# Patient Record
Sex: FEMALE | Race: WHITE | NOT HISPANIC OR LATINO | Employment: OTHER | URBAN - METROPOLITAN AREA
[De-identification: names, ages, dates, MRNs, and addresses within clinical notes are randomized per-mention and may not be internally consistent; named-entity substitution may affect disease eponyms.]

---

## 2017-08-14 ENCOUNTER — COMMUNICATION - HEALTHEAST (OUTPATIENT)
Dept: INTERNAL MEDICINE | Facility: CLINIC | Age: 62
End: 2017-08-14

## 2017-08-15 ENCOUNTER — OFFICE VISIT - HEALTHEAST (OUTPATIENT)
Dept: INTERNAL MEDICINE | Facility: CLINIC | Age: 62
End: 2017-08-15

## 2017-08-15 ENCOUNTER — COMMUNICATION - HEALTHEAST (OUTPATIENT)
Dept: INTERNAL MEDICINE | Facility: CLINIC | Age: 62
End: 2017-08-15

## 2017-08-15 DIAGNOSIS — Z12.11 SCREEN FOR COLON CANCER: ICD-10-CM

## 2017-08-15 ASSESSMENT — MIFFLIN-ST. JEOR: SCORE: 1543.33

## 2017-08-22 ENCOUNTER — RECORDS - HEALTHEAST (OUTPATIENT)
Dept: ADMINISTRATIVE | Facility: OTHER | Age: 62
End: 2017-08-22

## 2017-08-31 ENCOUNTER — COMMUNICATION - HEALTHEAST (OUTPATIENT)
Dept: INTERNAL MEDICINE | Facility: CLINIC | Age: 62
End: 2017-08-31

## 2017-09-05 ENCOUNTER — COMMUNICATION - HEALTHEAST (OUTPATIENT)
Dept: INTERNAL MEDICINE | Facility: CLINIC | Age: 62
End: 2017-09-05

## 2017-09-05 ENCOUNTER — OFFICE VISIT - HEALTHEAST (OUTPATIENT)
Dept: INTERNAL MEDICINE | Facility: CLINIC | Age: 62
End: 2017-09-05

## 2017-09-05 DIAGNOSIS — Z01.818 PREOP EXAMINATION: ICD-10-CM

## 2017-09-05 LAB
ATRIAL RATE - MUSE: 65 BPM
DIASTOLIC BLOOD PRESSURE - MUSE: NORMAL MMHG
INTERPRETATION ECG - MUSE: NORMAL
P AXIS - MUSE: 48 DEGREES
PR INTERVAL - MUSE: 150 MS
QRS DURATION - MUSE: 78 MS
QT - MUSE: 392 MS
QTC - MUSE: 407 MS
R AXIS - MUSE: 16 DEGREES
SYSTOLIC BLOOD PRESSURE - MUSE: NORMAL MMHG
T AXIS - MUSE: 21 DEGREES
VENTRICULAR RATE- MUSE: 65 BPM

## 2017-09-05 ASSESSMENT — MIFFLIN-ST. JEOR: SCORE: 1552.4

## 2017-09-22 ENCOUNTER — ANESTHESIA - HEALTHEAST (OUTPATIENT)
Dept: SURGERY | Facility: CLINIC | Age: 62
End: 2017-09-22

## 2017-09-22 ENCOUNTER — RECORDS - HEALTHEAST (OUTPATIENT)
Dept: ADMINISTRATIVE | Facility: OTHER | Age: 62
End: 2017-09-22

## 2017-09-22 ENCOUNTER — SURGERY - HEALTHEAST (OUTPATIENT)
Dept: SURGERY | Facility: CLINIC | Age: 62
End: 2017-09-22

## 2017-09-22 ASSESSMENT — MIFFLIN-ST. JEOR: SCORE: 1555.57

## 2017-10-06 ENCOUNTER — COMMUNICATION - HEALTHEAST (OUTPATIENT)
Dept: INTERNAL MEDICINE | Facility: CLINIC | Age: 62
End: 2017-10-06

## 2018-01-30 ENCOUNTER — COMMUNICATION - HEALTHEAST (OUTPATIENT)
Dept: INTERNAL MEDICINE | Facility: CLINIC | Age: 63
End: 2018-01-30

## 2018-02-02 ENCOUNTER — AMBULATORY - HEALTHEAST (OUTPATIENT)
Dept: INTERNAL MEDICINE | Facility: CLINIC | Age: 63
End: 2018-02-02

## 2018-02-02 ENCOUNTER — AMBULATORY - HEALTHEAST (OUTPATIENT)
Dept: LAB | Facility: CLINIC | Age: 63
End: 2018-02-02

## 2018-02-02 ENCOUNTER — COMMUNICATION - HEALTHEAST (OUTPATIENT)
Dept: SCHEDULING | Facility: CLINIC | Age: 63
End: 2018-02-02

## 2018-02-02 DIAGNOSIS — N39.0 UTI (URINARY TRACT INFECTION): ICD-10-CM

## 2018-02-02 LAB
ALBUMIN UR-MCNC: NEGATIVE MG/DL
APPEARANCE UR: CLEAR
BILIRUB UR QL STRIP: NEGATIVE
COLOR UR AUTO: YELLOW
GLUCOSE UR STRIP-MCNC: NEGATIVE MG/DL
HGB UR QL STRIP: NEGATIVE
KETONES UR STRIP-MCNC: NEGATIVE MG/DL
LEUKOCYTE ESTERASE UR QL STRIP: NEGATIVE
NITRATE UR QL: NEGATIVE
PH UR STRIP: 6 [PH] (ref 5–8)
SP GR UR STRIP: <=1.005 (ref 1–1.03)
UROBILINOGEN UR STRIP-ACNC: NORMAL

## 2018-02-05 ENCOUNTER — COMMUNICATION - HEALTHEAST (OUTPATIENT)
Dept: INTERNAL MEDICINE | Facility: CLINIC | Age: 63
End: 2018-02-05

## 2019-03-04 ENCOUNTER — COMMUNICATION - HEALTHEAST (OUTPATIENT)
Dept: INTERNAL MEDICINE | Facility: CLINIC | Age: 64
End: 2019-03-04

## 2019-03-04 ENCOUNTER — AMBULATORY - HEALTHEAST (OUTPATIENT)
Dept: INTERNAL MEDICINE | Facility: CLINIC | Age: 64
End: 2019-03-04

## 2019-03-04 DIAGNOSIS — T78.3XXA ANGIOEDEMA, INITIAL ENCOUNTER: ICD-10-CM

## 2019-03-11 ENCOUNTER — OFFICE VISIT - HEALTHEAST (OUTPATIENT)
Dept: ALLERGY | Facility: CLINIC | Age: 64
End: 2019-03-11

## 2019-03-11 DIAGNOSIS — Z91.018 FOOD ALLERGY: ICD-10-CM

## 2019-03-11 DIAGNOSIS — Z01.82 ENCOUNTER FOR ALLERGY TESTING: ICD-10-CM

## 2019-03-11 ASSESSMENT — MIFFLIN-ST. JEOR: SCORE: 1653.1

## 2019-05-23 ENCOUNTER — OFFICE VISIT - HEALTHEAST (OUTPATIENT)
Dept: FAMILY MEDICINE | Facility: CLINIC | Age: 64
End: 2019-05-23

## 2019-05-23 ENCOUNTER — COMMUNICATION - HEALTHEAST (OUTPATIENT)
Dept: SCHEDULING | Facility: CLINIC | Age: 64
End: 2019-05-23

## 2019-05-23 DIAGNOSIS — B96.89 ACUTE BACTERIAL SINUSITIS: ICD-10-CM

## 2019-05-23 DIAGNOSIS — J01.90 ACUTE BACTERIAL SINUSITIS: ICD-10-CM

## 2019-05-28 ENCOUNTER — COMMUNICATION - HEALTHEAST (OUTPATIENT)
Dept: SCHEDULING | Facility: CLINIC | Age: 64
End: 2019-05-28

## 2019-05-28 ENCOUNTER — COMMUNICATION - HEALTHEAST (OUTPATIENT)
Dept: INTERNAL MEDICINE | Facility: CLINIC | Age: 64
End: 2019-05-28

## 2019-05-28 DIAGNOSIS — J01.90 ACUTE BACTERIAL SINUSITIS: ICD-10-CM

## 2019-05-28 DIAGNOSIS — B96.89 ACUTE BACTERIAL SINUSITIS: ICD-10-CM

## 2019-07-20 ENCOUNTER — OFFICE VISIT - HEALTHEAST (OUTPATIENT)
Dept: FAMILY MEDICINE | Facility: CLINIC | Age: 64
End: 2019-07-20

## 2019-07-20 DIAGNOSIS — H66.002 NON-RECURRENT ACUTE SUPPURATIVE OTITIS MEDIA OF LEFT EAR WITHOUT SPONTANEOUS RUPTURE OF TYMPANIC MEMBRANE: ICD-10-CM

## 2019-07-20 DIAGNOSIS — J03.90 TONSILLITIS: ICD-10-CM

## 2019-07-20 LAB — DEPRECATED S PYO AG THROAT QL EIA: NORMAL

## 2019-07-21 LAB — GROUP A STREP BY PCR: NORMAL

## 2020-01-31 ENCOUNTER — COMMUNICATION - HEALTHEAST (OUTPATIENT)
Dept: SCHEDULING | Facility: CLINIC | Age: 65
End: 2020-01-31

## 2020-01-31 ENCOUNTER — OFFICE VISIT - HEALTHEAST (OUTPATIENT)
Dept: FAMILY MEDICINE | Facility: CLINIC | Age: 65
End: 2020-01-31

## 2020-01-31 DIAGNOSIS — J02.0 STREPTOCOCCAL SORE THROAT: ICD-10-CM

## 2020-01-31 DIAGNOSIS — J02.9 SORE THROAT: ICD-10-CM

## 2020-01-31 LAB — DEPRECATED S PYO AG THROAT QL EIA: ABNORMAL

## 2020-01-31 ASSESSMENT — MIFFLIN-ST. JEOR: SCORE: 1639.15

## 2021-04-18 ENCOUNTER — OFFICE VISIT (OUTPATIENT)
Dept: URGENT CARE | Facility: URGENT CARE | Age: 66
End: 2021-04-18
Payer: MEDICARE

## 2021-04-18 VITALS
WEIGHT: 220 LBS | TEMPERATURE: 97.7 F | OXYGEN SATURATION: 98 % | HEIGHT: 68 IN | HEART RATE: 85 BPM | SYSTOLIC BLOOD PRESSURE: 150 MMHG | DIASTOLIC BLOOD PRESSURE: 80 MMHG | BODY MASS INDEX: 33.34 KG/M2

## 2021-04-18 DIAGNOSIS — R07.0 THROAT PAIN: Primary | ICD-10-CM

## 2021-04-18 LAB
DEPRECATED S PYO AG THROAT QL EIA: NEGATIVE
SPECIMEN SOURCE: NORMAL
SPECIMEN SOURCE: NORMAL
STREP GROUP A PCR: NOT DETECTED

## 2021-04-18 PROCEDURE — 99N1174 PR STATISTIC STREP A RAPID: Performed by: FAMILY MEDICINE

## 2021-04-18 PROCEDURE — 99213 OFFICE O/P EST LOW 20 MIN: CPT | Performed by: FAMILY MEDICINE

## 2021-04-18 PROCEDURE — 87651 STREP A DNA AMP PROBE: CPT | Performed by: FAMILY MEDICINE

## 2021-04-18 ASSESSMENT — MIFFLIN-ST. JEOR: SCORE: 1586.41

## 2021-04-30 ENCOUNTER — OFFICE VISIT - HEALTHEAST (OUTPATIENT)
Dept: INTERNAL MEDICINE | Facility: CLINIC | Age: 66
End: 2021-04-30

## 2021-04-30 DIAGNOSIS — L91.8 SKIN TAG: ICD-10-CM

## 2021-04-30 DIAGNOSIS — H91.8X2 OTHER SPECIFIED HEARING LOSS OF LEFT EAR, UNSPECIFIED HEARING STATUS ON CONTRALATERAL SIDE: ICD-10-CM

## 2021-04-30 ASSESSMENT — PATIENT HEALTH QUESTIONNAIRE - PHQ9: SUM OF ALL RESPONSES TO PHQ QUESTIONS 1-9: 0

## 2021-05-21 ENCOUNTER — OFFICE VISIT - HEALTHEAST (OUTPATIENT)
Dept: OTOLARYNGOLOGY | Facility: CLINIC | Age: 66
End: 2021-05-21

## 2021-05-21 ENCOUNTER — OFFICE VISIT - HEALTHEAST (OUTPATIENT)
Dept: AUDIOLOGY | Facility: CLINIC | Age: 66
End: 2021-05-21

## 2021-05-21 ENCOUNTER — RECORDS - HEALTHEAST (OUTPATIENT)
Dept: ADMINISTRATIVE | Facility: OTHER | Age: 66
End: 2021-05-21

## 2021-05-21 DIAGNOSIS — H93.13 TINNITUS OF BOTH EARS: ICD-10-CM

## 2021-05-21 RX ORDER — CETIRIZINE HYDROCHLORIDE 10 MG/1
10 TABLET ORAL DAILY
Status: SHIPPED | COMMUNITY
Start: 2021-05-21 | End: 2023-07-01

## 2021-05-26 ENCOUNTER — RECORDS - HEALTHEAST (OUTPATIENT)
Dept: ADMINISTRATIVE | Facility: CLINIC | Age: 66
End: 2021-05-26

## 2021-05-27 ASSESSMENT — PATIENT HEALTH QUESTIONNAIRE - PHQ9: SUM OF ALL RESPONSES TO PHQ QUESTIONS 1-9: 0

## 2021-05-28 ENCOUNTER — RECORDS - HEALTHEAST (OUTPATIENT)
Dept: ADMINISTRATIVE | Facility: CLINIC | Age: 66
End: 2021-05-28

## 2021-05-29 NOTE — TELEPHONE ENCOUNTER
"Pt reports a sore throat, white spots on throat and huge tonsils, cough, \"flushed\" feeling.    Advised pt to be seen in clinic within 24 hours per protocol.    Pt agrees to go to [facility] today.     Reason for Disposition    Earache also present    Protocols used: SORE THROAT-A-AH      "

## 2021-05-29 NOTE — TELEPHONE ENCOUNTER
You are absolutely correct with 5-day course of azithromycin in the form of Z-Jair last in the system 10 days.  But if the patient would like to have another course of Z-Jair we can okay that now please call patient and pharmacy.  She should start it if she does now just taken 1 tablet daily until all 6 tablets have been consumed.

## 2021-05-29 NOTE — TELEPHONE ENCOUNTER
Rx teed for auth    Left message to call back for: [place]  Information to relay to patient:  See message from PCP

## 2021-05-29 NOTE — TELEPHONE ENCOUNTER
Who is calling:  Patient   Reason for Call:  lmtcb , Relayed msg and patient agrees. Patient would like the rest of Rx to be sent to Pharmacy . Please advise   Date of last appointment with primary care: 05/23/19  Okay to leave a detailed message: Yes

## 2021-05-29 NOTE — TELEPHONE ENCOUNTER
"Pt tele# [phone]    [facility] [address]     Call from pt       \"Symptoms not totally gone\"      Last dose of azithromycin was yesterday     Currently:   > \"Scratchy upper throat\"    > \"Nose fairly stuffy\" \"    > Still some post nasal drip /drainage      Ear pressure better     No fever       I can message provider to see if an additional course is indicated; however, a 5-day course of azithromycin is as potent as longer courses of other ABX      Discussed at home routine cold / flu symptoms management including   >Fluids - body needs to stay hydrated - drink more water to stay hydrated   > Warm liquids - hot water + honey   >Nutrition - body needs the extra calories when ill - eat healthy when you can   >Rest - rest when you can - try and get a good night's sleep   > OTC pain relievers - follow label directions for dosing   >Read labels of any OTC cold medications to prevent accidental \"double dosing\" of ingredients   > Humidifier / steam showers - these are excellent ways to help stuffy noses, nasal or chest congestion and with any coughing or sore throats      > Watch your temp and breathing - call back if you start spiking a high temperature 103F+ or have any breathing problems or if the sx change or worsen in some way          Haseeb Veloz, RN   Triage and Medication Refills  "

## 2021-05-30 ENCOUNTER — RECORDS - HEALTHEAST (OUTPATIENT)
Dept: ADMINISTRATIVE | Facility: CLINIC | Age: 66
End: 2021-05-30

## 2021-05-30 NOTE — PATIENT INSTRUCTIONS - HE
You have an ear infection.  Please begin taking the antibiotic, doxycycline, to treat this infection.  You will take this twice daily for 10 days.  Please complete all the medication.    Please use Tylenol 650mg every 4 hours or ibuprofen 600mg every 6 hours by mouth for pain/fever.  Do not exceed 4000mg of acetaminophen or 2400mg of ibuprofen from any source in a 24 hour period.  Taking Tylenol and ibuprofen together may be helpful in reducing pain.    If you are still having symptoms following completing the medication, please see your primary care provider for a recheck.  Return to clinic with new or worsening symptoms.    You have been prescribed an antibiotic, doxycycline.  This medication is known to increase sensitivity to the sun and risk for sunburn.  Please limit sun and ultraviolet light exposure while taking this medication, and use meticulous sun protection including high SPF sunscreens, hats, protective clothing, etc.

## 2021-05-30 NOTE — PROGRESS NOTES
ASSESSMENT:   1. Tonsillitis  Rapid Strep A Screen-Throat    Group A Strep, RNA Direct Detection, Throat   2. Non-recurrent acute suppurative otitis media of left ear without spontaneous rupture of tympanic membrane  doxycycline (VIBRA-TABS) 100 MG tablet       PLAN:  Patient does have a left otitis media present on exam, she does have a significant tonsillitis with a negative strep test today.  Patient has multiple drug allergies as well as intolerance of ciprofloxacin, notes that she had to take 2 Z-Paks the last time she was ill.  We will go ahead and try 10 days of doxycycline, symptomatic care is advised.  Will return with new or worsening symptoms.    I discussed red flag symptoms, return precautions to clinic/ER and follow up care with patient/parent.  Expected clinical course, symptomatic cares advised. Questions answered. Patient/parent amenable with plan.    Patient Instructions:  Patient Instructions   You have an ear infection.  Please begin taking the antibiotic, doxycycline, to treat this infection.  You will take this twice daily for 10 days.  Please complete all the medication.    Please use Tylenol 650mg every 4 hours or ibuprofen 600mg every 6 hours by mouth for pain/fever.  Do not exceed 4000mg of acetaminophen or 2400mg of ibuprofen from any source in a 24 hour period.  Taking Tylenol and ibuprofen together may be helpful in reducing pain.    If you are still having symptoms following completing the medication, please see your primary care provider for a recheck.  Return to clinic with new or worsening symptoms.    You have been prescribed an antibiotic, doxycycline.  This medication is known to increase sensitivity to the sun and risk for sunburn.  Please limit sun and ultraviolet light exposure while taking this medication, and use meticulous sun protection including high SPF sunscreens, hats, protective clothing, etc.          SUBJECTIVE:   Marlin Munoz is a 63 y.o. female who presents  today with sore throat, congestion, left ear pain for the past 2 days.  Has been more tired than normal for 2 days.  No fevers.  Grandchildren have URIs.  No shortness of breath, chest pain or hemoptysis.  States had similar symptoms in May requiring 2 Zpacks to clear.      ROS:  Comprehensive 12 pt ROS completed, positives noted in HPI, otherwise negative.      Past Medical History:  Patient Active Problem List   Diagnosis     Pigmented Nevus     Hemangioma Capillary     Skin Neoplasm Of Uncertain Behavior     Seborrheic Keratosis     Acute Sinusitis     Fatigue     Subcutaneous Lipoma     Lipoma Of The Adipose Tissue       Surgical History:  Past Surgical History:   Procedure Laterality Date     BREAST BIOPSY Left       SECTION       HYSTERECTOMY       lipoma removed       OOPHORECTOMY             Family History:  Family History   Problem Relation Age of Onset     Breast cancer Mother 80     Cancer Father 46        Lung     Colon cancer Paternal Aunt 55     Cancer Paternal Uncle 65        Lung       Reviewed; Non-contributory    Social History     Tobacco Use   Smoking Status Never Smoker   Smokeless Tobacco Never Used       Current Medications:  Current Outpatient Medications on File Prior to Visit   Medication Sig Dispense Refill     [DISCONTINUED] azithromycin (ZITHROMAX) 250 MG tablet Take one tablet once daily 6 tablet 0     [DISCONTINUED] oxyCODONE-acetaminophen (PERCOCET) 5-325 mg per tablet Take 1-2 tablets by mouth every 4 (four) hours as needed. 40 tablet 0     No current facility-administered medications on file prior to visit.        Allergies:   Allergies   Allergen Reactions     Aspirin (Tartrazine Only) Hives     Other Environmental Allergy      Seasonal allergies     Other Food Allergy Itching     Walnuts     Keflex [Cephalexin] Rash     Penicillins Rash     Sulfa (Sulfonamide Antibiotics) Rash       OBJECTIVE:   Vitals:    19 1504   BP: 116/78   Patient Site: Right Arm   Patient  Position: Sitting   Cuff Size: Adult Large   Pulse: 88   Temp: 98.6  F (37  C)   TempSrc: Oral   SpO2: 96%   Weight: (!) 225 lb 7 oz (102.3 kg)     Physical exam reveals a pleasant 63 y.o. female.   General: Appears healthy, alert and cooperative. Non-toxic appearance.  Eyes:  No conjunctivitis, lids normal.   Ears:  Normal appearing auricle. External auditory meatus without excessive cerumen, edema or erythema. right TM dull, left TM erythematous and bulging and normal canals bilaterally.  No mastoid tenderness. No pain with palpation over tragus.  Nose:    Mucosa normal. Clear rhinorrhea. No sinus tenderness.  Mouth:  Mucosa pink and moist.  moderate erythema and tonsillar hypertrophy, asymmetric Left greater than right. No trismus. No evidence of PTA. Normal phonation.  Neck: few small anterior cervical nodes  Pulmonary/Chest: Chest is clear, no wheezing, rhonchi or rales. Symmetric air entry throughout both lung fields. Symmetrical chest wall movement.  Heart: regular rate and rhythm, no murmur, rub or gallop  Neuro: Alert, oriented. Non-focal.  Skin: pink, warm, dry, and without lesions on limited skin exam. No rashes.  Psychiatric: Normal mood and affect.  Normal judgement and thought content. Normal behavior.       RADIOLOGY  none  LABORATORY STUDIES    none      Arianna Mcarthur, PHILIP

## 2021-05-31 VITALS — BODY MASS INDEX: 31.06 KG/M2 | HEIGHT: 69 IN | WEIGHT: 209.7 LBS

## 2021-05-31 VITALS — WEIGHT: 207 LBS | HEIGHT: 69 IN | BODY MASS INDEX: 30.66 KG/M2

## 2021-05-31 VITALS — HEIGHT: 69 IN | WEIGHT: 209 LBS | BODY MASS INDEX: 30.96 KG/M2

## 2021-06-02 VITALS — BODY MASS INDEX: 34.24 KG/M2 | WEIGHT: 231.2 LBS | HEIGHT: 69 IN

## 2021-06-03 VITALS — BODY MASS INDEX: 34.11 KG/M2 | WEIGHT: 231 LBS

## 2021-06-03 VITALS — BODY MASS INDEX: 33.29 KG/M2 | WEIGHT: 225.44 LBS

## 2021-06-04 VITALS
HEART RATE: 88 BPM | BODY MASS INDEX: 33.79 KG/M2 | WEIGHT: 228.13 LBS | RESPIRATION RATE: 16 BRPM | DIASTOLIC BLOOD PRESSURE: 84 MMHG | SYSTOLIC BLOOD PRESSURE: 135 MMHG | OXYGEN SATURATION: 95 % | TEMPERATURE: 97.4 F | HEIGHT: 69 IN

## 2021-06-05 NOTE — PROGRESS NOTES
Assessment:     1. Streptococcal sore throat  azithromycin (ZITHROMAX Z-MANE) 250 MG tablet   2. Sore throat  Rapid Strep A Screen-Throat swab          Plan:     Patient has strep throat.  Will treat strep with azithromycin.  Patient is contagious for 24 hours after she starts taking the first dose of antibiotics.  Should throw out toothbrush after you have been on antibiotics for 48 hours.  May take Tylenol or ibuprofen as needed for fever or discomfort.  Please follow-up if symptoms are getting worse or not improving.        Subjective:       64 y.o. female presents for evaluation for a 1 day history of a sensation of itching on the right side of her face along her jawline and up towards her ear.  She is also been having a sore throat for the past week.  she noticed some slight swelling underneath the right side of her jaw but is not having any pain, tingling, or burning.  She has not noticed any rash or skin changes.  The left side of her face is been unaffected other than a very slight area of itching underneath her left eye.  She has been feeling a bit rundown over the past week and has had a sore throat but has not had any difficulty swallowing or breathing.  No nasal congestion or cough.  She has not had any fevers.    Patient Active Problem List   Diagnosis     Pigmented Nevus     Hemangioma Capillary     Skin Neoplasm Of Uncertain Behavior     Seborrheic Keratosis     Acute Sinusitis     Fatigue     Subcutaneous Lipoma     Lipoma Of The Adipose Tissue       Past Medical History:   Diagnosis Date     Arthritis     osteoarthritis     Fatigue      Fibromyalgia      Hemangioma     capillary     Lipoma      Seborrheic keratoses      Sinusitis        Past Surgical History:   Procedure Laterality Date     BREAST BIOPSY Left       SECTION       HYSTERECTOMY       lipoma removed       OOPHORECTOMY         No current outpatient medications on file prior to visit.     No current facility-administered  medications on file prior to visit.        Allergies   Allergen Reactions     Aspirin (Tartrazine Only) Hives     Other Environmental Allergy      Seasonal allergies     Other Food Allergy Itching     Walnuts     Keflex [Cephalexin] Rash     Penicillins Rash     Sulfa (Sulfonamide Antibiotics) Rash       Family History   Problem Relation Age of Onset     Breast cancer Mother 80     Cancer Father 46        Lung     Colon cancer Paternal Aunt 55     Cancer Paternal Uncle 65        Lung       Social History     Socioeconomic History     Marital status:      Spouse name: None     Number of children: None     Years of education: None     Highest education level: None   Occupational History     None   Social Needs     Financial resource strain: None     Food insecurity:     Worry: None     Inability: None     Transportation needs:     Medical: None     Non-medical: None   Tobacco Use     Smoking status: Never Smoker     Smokeless tobacco: Never Used   Substance and Sexual Activity     Alcohol use: Yes     Alcohol/week: 2.0 standard drinks     Types: 2 Glasses of wine per week     Drug use: No     Sexual activity: None   Lifestyle     Physical activity:     Days per week: None     Minutes per session: None     Stress: None   Relationships     Social connections:     Talks on phone: None     Gets together: None     Attends Sikhism service: None     Active member of club or organization: None     Attends meetings of clubs or organizations: None     Relationship status: None     Intimate partner violence:     Fear of current or ex partner: None     Emotionally abused: None     Physically abused: None     Forced sexual activity: None   Other Topics Concern     None   Social History Narrative    Presented with  10/17/17         Review of Systems  A 12 point comprehensive review of systems was negative except as noted.      Objective:                                  General Appearance:      Vitals:    01/31/20  1728   BP: 135/84   Pulse: 88   Resp: 16   Temp: 97.4  F (36.3  C)   SpO2: 95%           Alert, pleasant, cooperative, no distress, appears stated age   Head:    Normocephalic, without obvious abnormality, atraumatic   Eyes:    Conjunctiva/corneas clear.  No swelling or rash noted around her eyes.   Ears:    Normal TM's without erythema or bulging. Normal external ear canals, both ears   Nose:   Nares normal, septum midline, mucosa normal, no drainage    or sinus tenderness   Throat:  Oropharynx is mildly erythematous in her posterior oropharynx without significant tonsillar hypertrophy.  There is no exudates seen.  Mucous members are moist.  No other oral lesions noted.  No peritonsillar swelling.   Neck:  Neck supple with some anterior cervical lymphadenopathy most prominent on the right side.   Lungs:     Clear to auscultation bilaterally without wheezes, rales, or rhonchi, respirations unlabored    Heart:    Regular rate and rhythm, S1 and S2 normal, no murmur, rub or gallop       Extremities:   Extremities normal, atraumatic, no cyanosis or edema   Skin:   Skin color, texture, turgor normal, no rashes or lesions            This note has been dictated using voice recognition software. Any grammatical or context distortions are unintentional and inherent to the software

## 2021-06-05 NOTE — TELEPHONE ENCOUNTER
RN Assessment/Reason for Call:   Okay to leave Detailed Message  Patient calling in, woke up swelling jaw front of rt ear, increased slightly, hive opposite side of her face. Itchy.  Dull pain in her rt ear.   Nasal allergies.     RN Action/Disposition:  Protocol recommends see Dr in 24 hrs.(4:10 clinic closed)  Offered WIC  Call back if worse symptoms  Discussed home care measures.  Agrees to plan.     Brenda Farrell RN    [facility] Triage/med refill  1/31/2020  4:13 PM        Reason for Disposition    Earache persists > 1 hour    Protocols used: EAR - CONGESTION-A-AH

## 2021-06-12 NOTE — PROGRESS NOTES
Office Visit - Follow up    Marlin Munoz   61 y.o. female    Date of Visit: 8/15/2017    Chief Complaint   Patient presents with     Mass     left clavicle area and lymph nodes in neck feel swollen       Subjective: Screening for cancer.  Palpable neck mass left supraclavicular space feels like a lipoma but lymphoma and/or lymphoid aggregate lymph node not excluded.  Discussed with patient.  No constitutional symptoms of fever chills night sweats.    Neck nodes feel swollen to her.  Last colonoscopy negative on November 7, 2007 with [facility] a polyp was seen but the path report is not in the chart.    Mammogram dated June 1, 2016 was negative.  No sign of cancer also ultrasound of the right breast was clear negative.    No blood in stool or urine no chest pain or shortness of breath.  No constitutional B symptoms.  Weight is stable although down 18 pounds from the last visit.  Medication list reviewed generally well-tolerated tolerated.    ROS: A comprehensive review of systems was performed and was otherwise negative    Medications:  Prior to Admission medications    Not on File       Allergies:   Allergies   Allergen Reactions     Aspirin (Tartrazine Only) Hives     Keflex [Cephalexin] Rash     Penicillins Rash     Sulfa (Sulfonamide Antibiotics) Rash       Immunizations:   Immunization History   Administered Date(s) Administered     DT (pediatric) 02/18/1999, 05/22/2003     Tdap 09/03/2009       Exam palpable mass left supraclavicular space near the junction of the acromioclavicular joint it has a feelings consistency of a lipoma but lymph node swelling not excluded.  No other significant adenopathy appreciated in right supraclavicular space or anterior posterior cervical triangle.  Chest clear heart tones normal mildly diaphoretic during the exam perspiring.  No central acrocyanosis no tachypnea not acutely ill.  Obesity noted with BMI 30+.    Assessment and Plan  Lipoma lymphoid aggregate left  supraclavicular space suggest general surgery consultation with Dr. SERGIO Palomino at [phone].  For excisional biopsy.    Obesity.    History of colon polyps.    Multiple drug allergies including aspirin cephalexin penicillin and sulfa.    History of multiple subcutaneous lipomas.    Time: total time spent with the patient was 25 minutes of which >50% was spent in counseling and coordination of care    The following high BMI interventions were performed this visit: encouragement to exercise    Mil Muhammad MD    Patient Active Problem List   Diagnosis     Pigmented Nevus     Hemangioma Capillary     Skin Neoplasm Of Uncertain Behavior     Seborrheic Keratosis     Acute Sinusitis     Fatigue     Subcutaneous Lipoma     Lipoma Of The Adipose Tissue

## 2021-06-12 NOTE — PROGRESS NOTES
Office Visit - Physical    Marlin Munoz   62 y.o. female    Date of Visit: 2017    Chief Complaint   Patient presents with     Pre-op Exam     biopsy left neck mass at [place] Dr. Lamin Palomino on 2017       Subjective: Preoperative examination left neck mass to be removed 2017 [facility] Dr. SERGIO Palomino presiding.    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    Left supraclavicular lipoma most likely in the far lateral aspect of the supraclavicular space left side.  No associated constitutional symptoms of fever night sweats or chills.    ROS: A comprehensive review of systems was performed and was otherwise negative    Medications:   Prior to Admission medications    Not on File       Allergies:  Allergies   Allergen Reactions     Aspirin (Tartrazine Only) Hives     Keflex [Cephalexin] Rash     Penicillins Rash     Sulfa (Sulfonamide Antibiotics) Rash       Immunizations:   Immunization History   Administered Date(s) Administered     DT (pediatric) 1999, 2003     Tdap 2009       Health Maintenance: Immunizations reviewed up-to-date colonoscopy dated 2007 [facility] presiding showed no significant abnormalities there was a lump noted radiographically from the inner aspect of her right breast negative on ultrasound and no sign of cancer.  Mammogram dated 2016.  Also ultrasound done bilateral allCLEAR no cancer.    Past Medical History: Lipomas previously removed.    .    Hysterectomy has been done no cancer.    Other significant illnesses noted allergies multiple to drugs as noted above including aspirin cephalexin penicillin and sulfa all causing rash or hives.    Past Surgical History: See above otherwise negative.    Family History: Mother living age 98.    Father  lung  cancer heavy smoker age 45.    2 children well  well supportive 2 grandchildren.    Social History: .  [employer] graduate.    Exam Chest clear to auscultation and percussion.  Heart tones regular rhythm without murmur rub or gallop.  Abdomen soft nontender no organomegaly.  No peritoneal signs.  Extremities free of edema cyanosis or clubbing.  Neck veins nondistended no thyromegaly or scleral icterus noted, carotids full.  Skin warm and dry easily conversant good spirited.  Normal intelligence.  Neurologically intact no gross localizing findings.    Assessment and Plan  Left supraclavicular mass lateral aspect has the appearance is of a benign lipoma rule out lymphadenopathy.  Hemogram and basic metabolic profile are pending electric cardiogram showed sinus mechanism rate 65 normal EKG.    Prior history of  and hysterectomy and other lipomas removed without evidence for prior anesthetic complications.    Multiple drug allergies noted above.    Prior breast biopsy left side all clear.    Medically acceptable risk for anticipated surgery.    Total time spent with the patient today was 40 minutes of which greater than 50% was spent in counseling and coordination of care.    Mil Muhammad MD    Patient Active Problem List   Diagnosis     Pigmented Nevus     Hemangioma Capillary     Skin Neoplasm Of Uncertain Behavior     Seborrheic Keratosis     Acute Sinusitis     Fatigue     Subcutaneous Lipoma     Lipoma Of The Adipose Tissue

## 2021-06-13 NOTE — ANESTHESIA PREPROCEDURE EVALUATION
Anesthesia Evaluation      Patient summary reviewed   No history of anesthetic complications     Airway   Mallampati: II  Neck ROM: full   Pulmonary - negative ROS and normal exam                          Cardiovascular - negative ROS and normal exam  Exercise tolerance: > or = 4 METS   Neuro/Psych      Comments: fibromyalgia      Endo/Other - negative ROS      GI/Hepatic/Renal - negative ROS           Dental - normal exam                        Anesthesia Plan  Planned anesthetic: general endotracheal  - succinylcholine  ASA 2   Induction: intravenous   Anesthetic plan and risks discussed with: patient    Post-op plan: routine recovery

## 2021-06-13 NOTE — ANESTHESIA CARE TRANSFER NOTE
Last vitals:   Vitals:    09/22/17 1250   BP: 153/82   Pulse: 80   Resp: 16   Temp: 36.4  C (97.6  F)   SpO2: 100%     Patient's level of consciousness is drowsy  Spontaneous respirations: yes  Maintains airway independently: yes  Dentition unchanged: yes  Oropharynx: oropharynx clear of all foreign objects    QCDR Measures:  ASA# 20 - Surgical Safety Checklist: WHO surgical safety checklist completed prior to induction  PQRS# 430 - Adult PONV Prevention: 4558F - Pt received => 2 anti-emetic agents (different classes) preop & intraop  ASA# 8 - Peds PONV Prevention: NA - Not pediatric patient, not GA or 2 or more risk factors NOT present  PQRS# 424 - [place]-op Temp Management: 4559F - At least one body temp DOCUMENTED => 35.5C or 95.9F within required timeframe  PQRS# 426 - PACU Transfer Protocol: - Transfer of care checklist used  ASA# 14 - Acute Post-op Pain: ASA14B - Patient did NOT experience pain >= 7 out of 10

## 2021-06-13 NOTE — ANESTHESIA POSTPROCEDURE EVALUATION
Patient: Marlin CARTWRIGHT Alexander  BIOPSY LEFT NECK MASS  Anesthesia type: general    Patient location: Phase II Recovery  Last vitals:   Vitals:    09/22/17 1400   BP: 113/70   Pulse: 70   Resp: 16   Temp:    SpO2: 93%     Post vital signs: stable  Level of consciousness: awake and responds to simple questions  Post-anesthesia pain: pain controlled  Post-anesthesia nausea and vomiting: no  Pulmonary: unassisted  Cardiovascular: stable  Hydration: adequate  Anesthetic events: no    QCDR Measures:  ASA# 11 - [place]-op Cardiac Arrest: ASA11B - Patient did NOT experience unanticipated cardiac arrest  ASA# 12 - [place]-op Mortality Rate: ASA12B - Patient did NOT die  ASA# 13 - PACU Re-Intubation Rate: ASA13B - Patient did NOT require a new airway mgmt  ASA# 10 - Composite Anes Safety: ASA10A - No serious adverse event    Additional Notes:

## 2021-06-17 NOTE — PROGRESS NOTES
HPI: This patient is a 66yo F who presents for evaluation of hearing at the request of Dr. Muhammad. There is bilateral, non-pulsatile tinnitus, most noticeable when it is quiet. Really, she is hear to go through the testing to get her  to go through the testing. She does not have significant concerns and is mainly interested in a baseline. Denies otalgia, otorrhea, vertigo, and other major medical issues.     Past medical history, surgical history, social history, family history, medications, and allergies have been reviewed with the patient and are documented above.    Review of Systems: a 10-system review was performed. Pertinent positives are noted in the HPI and on a separate scanned document in the chart.    PHYSICAL EXAMINATION:  GEN: no acute distress, normocephalic  EYES: extraocular movements are intact, pupils are equal and round. Sclera clear.   EARS: auricles are normally formed. The external auditory canals are clear with minimal to no cerumen. Tympanic membranes are intact bilaterally with no signs of infection, effusion, retractions, or perforations.  NOSE: anterior nares are patent. .  OC/OP: clear, dentition is in good repair. The tongue and palate are fully mobile and symmetric. No masses or lesions.  NECK: soft and supple. No lymphadenopathy or masses. Airway is midline.  NEURO: CN VII and XII symmetric. alert and oriented. No spontaneous nystagmus. Gait is normal.  PULM: breathing comfortably on room air, normal chest expansion with respiration  CARDS: no cyanosis or clubbing, normal carotid pulses    AUDIOGRAM: normal sloping to borderline hearing, type a tymps, 100% wrs    MEDICAL DECISION-MAKING: This patient is a 66yo F with normal hearing and tinnitus. Discussed hearing protection and masking techniques.

## 2021-06-17 NOTE — PATIENT INSTRUCTIONS - HE
Patient Instructions by Gualberto Deluna DO at 5/23/2019  5:20 PM     Author: Gualberto Deluna DO Service: -- Author Type: Physician    Filed: 5/23/2019  5:51 PM Encounter Date: 5/23/2019 Status: Addendum    : Gualberto Deluna DO (Physician)    Related Notes: Original Note by Gualberto Deluna DO (Physician) filed at 5/23/2019  5:50 PM       See hand out for treatment tips.   Patient Education     Acute Bacterial Rhinosinusitis (ABRS)    Acute bacterial rhinosinusitis (ABRS) is an infection of your nasal cavity and sinuses. Its caused by bacteria. Acute means that youve had symptoms for less than 4 weeks, but possibly up to 12 weeks.  Understanding your sinuses  The nasal cavity is the large air-filled space behind your nose. The sinuses are a group of spaces formed by the bones of your face. They connect with your nasal cavity. ABRS causes the tissue lining these spaces to become inflamed. Mucus may not drain normally. This leads to facial pain and other symptoms.  What causes ABRS?  ABRS most often follows an upper respiratory infection caused by a virus. Bacteria then infect the lining of your nasal cavity and sinuses. But you can also get ABRS if you have:    Nasal allergies    Long-term nasal swelling and congestion not caused by allergies    Blockage in the nose  Symptoms of ABRS  The symptoms of ABRS may be different for each person and include:    Nasal congestion or blockage    Pain or pressure in the face    Thick, colored drainage from the nose  Other symptoms may include:    Runny nose    Fluid draining from the nose down the throat (postnasal drip)    Headache    Cough    Pain    Fever  Diagnosing ABRS  ABRS may be diagnosed if youve had an upper respiratory infection like a cold and cough for 10 or more days without improvement or with worsening symptoms. Your healthcare provider will ask about your symptoms and your medical history. The provider will check your vital signs, including your  temperature. Youll have a physical exam. The healthcare provider will check your ears, nose, and throat. You likely wont need any tests. If ABRS comes back, you may have a culture or other tests.  Treatment for ABRS  Treatment may include:    Antibiotic medicine. This is for symptoms that last for at least 10 to 14 days.    Nasal corticosteroid medicine. Drops or spray used in the nose can lessen swelling and congestion.    Over-the-counter pain medicine. This is to lessen sinus pain and pressure.    Nasal decongestant medicine. Spray or drops may help to lessen congestion. Do not use them for more than a few days.    Salt wash (saline irrigation). This can help to loosen mucus.  Possible complications of ABRS  ABRS may come back or become long-term (chronic). In rare cases, ABRS may cause complications such as:     Inflamed tissue around the brain and spinal cord (meningitis)    Inflamed tissue around the eyes (orbital cellulitis)    Inflamed bones around the sinuses (osteitis)  These problems may need to be treated in a hospital with intravenous (IV) antibiotic medicine or surgery.  When to call the healthcare provider  Call your healthcare provider if you have any of the following:    Symptoms that dont get better, or get worse    Symptoms that dont get better after 3 to 5 days on antibiotics    Trouble seeing    Swelling around your eyes    Confusion or trouble staying awake   Date Last Reviewed: 5/1/2017 2000-2017 [employer]. [address] [place] [address], [place] [address]. All rights reserved. This information is not intended as a substitute for professional medical care. Always follow your healthcare professional's instructions.

## 2021-06-18 NOTE — PATIENT INSTRUCTIONS - HE
Patient Instructions by Mar Polo MD at 1/31/2020  5:20 PM     Author: Mar Polo MD Service: -- Author Type: Physician    Filed: 1/31/2020  6:01 PM Encounter Date: 1/31/2020 Status: Signed    : Mar Polo MD (Physician)         Patient Education     Pharyngitis: Strep (Confirmed)    You have had a positive test for strep throat. Strep throat is a contagious illness. It is spread by coughing, kissing or by touching others after touching your mouth or nose. Symptoms include throat pain that is worse with swallowing, aching all over, headache, and fever. It is treated with antibiotic medicine. This should help you start to feel better in 1 to 2 days.  Home care    Rest at home. Drink plenty of fluids to you won't get dehydrated.    No work or school for the first 2 days of taking the antibiotics. After this time, you will not be contagious. You can then return to school or work if you are feeling better.     Take antibiotic medicine for the full 10 days, even if you feel better. This is very important to ensure the infection is treated. It is also important to prevent medicine-resistant germs from developing. If you were given an antibiotic shot, you don't need any more antibiotics.    You may use acetaminophen or ibuprofen to control pain or fever, unless another medicine was prescribed for this. Talk with your healthcare provider before taking these medicines if you have chronic liver or kidney disease. Also talk with your healthcare provider if you have had a stomach ulcer or GI bleeding.    Throat lozenges or sprays help reduce pain. Gargling with warm saltwater will also reduce throat pain. Dissolve 1/2 teaspoon of salt in 1 glass of warm water. This may be useful just before meals.     Soft foods are OK. Don't eat salty or spicy foods.  Follow-up care  Follow up with your healthcare provider or our staff if you don't get better over the next week.  When to seek medical  advice  Call your healthcare provider right away if any of these occur:    Fever of 100.4 F (38 C) or higher, or as directed by your healthcare provider    New or worsening ear pain, sinus pain, or headache    Painful lumps in the back of neck    Stiff neck    Lymph nodes getting larger or becoming soft in the middle    You can't swallow liquids or you can't open your mouth wide because of throat pain    Signs of dehydration. These include very dark urine or no urine, sunken eyes, and dizziness.    Trouble breathing or noisy breathing    Muffled voice    Rash  Prevention  Here are steps you can take to help prevent an infection:    Keep good hand washing habits.    Dont have close contact with people who have sore throats, colds, or other upper respiratory infections.    Dont smoke, and stay away from secondhand smoke.  Date Last Reviewed: 11/1/2017 2000-2017 [employer]. [address] [place] [address]. All rights reserved. This information is not intended as a substitute for professional medical care. Always follow your healthcare professional's instructions.

## 2021-06-24 NOTE — TELEPHONE ENCOUNTER
Referral Request  Type of referral: Allergist  Who s requesting: Patient  Why the request: Patient has had a recent episode of developing itching and tingling in her lips after eating shell fish.  Thirty years ago she had allergy testing and does know of other things she is allergic to but shellfish was not on the list.  Patient would like to get testing for an allergy to shell fish.  Have you been seen for this request: No:  Appointment Offered:  declined  Does patient have a preference on a group/provider?   Okay to leave a detailed message?  Yes

## 2021-06-24 NOTE — PATIENT INSTRUCTIONS - HE
Assessment:    Possible food allergy to shrimp.  Mildly positive test today    Plan:    Avoid shrimp until a food challenge can be done.  Plan on 3 hours.  Bring in cocktail shrimp

## 2021-06-24 NOTE — PROGRESS NOTES
Assessment:    Possible food allergy to shrimp.  Mildly positive test today    Plan:    Avoid shrimp until a food challenge can be done.  Described process of food challenge.  Patient to bring cocktail shrimp into the clinic.  Will use standard protocol 2-hour challenge with 1 hour observation.  For now 100% avoidance of all shellfish.  Fish can be included in the diet.  If positive food challenge an EpiPen will be prescribed as well as a food allergy action plan done.  If negative, shellfish should be added back in the diet.  ____________________________________________________________________________     Patient comes in today for evaluation of possible food allergy.  She reports recently she had pasta with shrimp.  Immediately following that she had tingling sensation in her mouth and a red rash on her face and lips.  No wheezing or shortness of breath.  No diffuse urticaria.  She has since eaten the other components of this pasta without any problem.  She does report that it had a significant amount of black pepper in the dish.  No previous history of food allergy to shellfish.  Patient was seen in allergy clinic in 2015.  At that time she was having some dysphagia.  She was diagnosed with environmental allergies and oral allergy syndrome.  It was not felt that she had IgE mediated food allergy at that time.  She does have environmental allergies.  She has nasal congestion, rhinorrhea, itchy watery eyes and sneezing.  No history of asthma.  No history of chronic urticaria.    Review of symptoms:  As above, otherwise negative    Past medical history: Fibromyalgia    Allergies: No known allergies to latex, foods or hymenoptera venom.  Keflex and sulfa antibiotics.    Family history: Mother and sisters with allergies.  No known member the family with asthma.    Social history: Currently lives in house with forced air heat and central air conditioning.  No pets in the home.  No cigarette smoking  history.    Medications: None    Physical Exam:  General:  Alert and in no apparent distress.  Eyes:  Sclera clear.  Ears: TMs translucent grey with bony landmarks visible. Nose: Pale, boggy mucosal membranes.  Throat: Pink, moist.  No lesions.  Neck: Supple.  No lymphadenopathy.  Lungs: CTA.  CV: Regular rate and rhythm. Extremities: Well perfused.  No clubbing or cyanosis. Skin: No rash    Last Food Skin Allergy Test Results  Shellfish  Shrimp  1:20 (W/F in mm): 3/F (03/11/19 1610)  Lobster  1:20 W/F in mm): 0/0 (03/11/19 1610)  Crab  1:20 (W/F in mm): 0/0 (03/11/19 1610)  Controls  Device Type: QUINTIP (03/11/19 1610)  Neg. Control: 50% Glycerine-Saline H (W/F in millimeters): 0/0 (03/11/19 1610)  Pos. Control Histamine 6 mg/ml (W/F in millimeters): 7/F (03/11/19 1610)     Patient seen upon request of Dr. Mil Moy regarding food allergy.

## 2021-06-27 NOTE — PROGRESS NOTES
Progress Notes by Gualberto Deluna DO at 2019  5:20 PM     Author: Gualberto Deluna DO Service: -- Author Type: Physician    Filed: 2019  9:36 AM Encounter Date: 2019 Status: Signed    : Gualberto Deluna DO (Physician)       Chief Complaint   Patient presents with   ? Illness     x 2 weeks, started with allergies, then started to get worse, ear pain right ear is worse, sore throat, hot and cold flashes     History of Present Illness: Rooming staff notes reviewed.  Patient tells me that about 2 weeks ago, she started to experience what felt like nasal allergies, which she tends to get this time of year.  No shortness of breath.  More recently, her typical nasal allergy symptoms have started to include other symptoms such as fever/chills, a sinus area headache, and body aches today.      Review of systems: See history of present illness, all others negative.     Current Outpatient Medications   Medication Sig Dispense Refill   ? azithromycin (ZITHROMAX) 250 MG tablet Take 500 mg (2 x 250 mg tablets) on day 1 followed by 250 mg (1 tablet) on days 2-5. 6 tablet 0   ? oxyCODONE-acetaminophen (PERCOCET) 5-325 mg per tablet Take 1-2 tablets by mouth every 4 (four) hours as needed. 40 tablet 0     No current facility-administered medications for this visit.      Past Medical History:   Diagnosis Date   ? Arthritis     osteoarthritis   ? Fatigue    ? Fibromyalgia    ? Hemangioma     capillary   ? Lipoma    ? Seborrheic keratoses    ? Sinusitis       Past Surgical History:   Procedure Laterality Date   ? BREAST BIOPSY Left    ?  SECTION     ? HYSTERECTOMY     ? lipoma removed     ? OOPHORECTOMY        Social History     Tobacco Use   ? Smoking status: Never Smoker   ? Smokeless tobacco: Never Used   Substance Use Topics   ? Alcohol use: Yes     Alcohol/week: 1.2 oz     Types: 2 Glasses of wine per week   ? Drug use: No        Family History   Problem Relation Age of Onset   ? Breast cancer Mother 80    ? Cancer Father 46        Lung   ? Colon cancer Paternal Aunt 55   ? Cancer Paternal Uncle 65        Lung       Vitals:    05/23/19 1730   BP: 133/83   Pulse: 82   Resp: 16   Temp: 97.6  F (36.4  C)   TempSrc: Oral   SpO2: 98%   Weight: (!) 231 lb (104.8 kg)       EXAM:   General: Vital signs reviewed.  Patient is in no acute appearing distress.  Breathing appears nonlabored.  Patient is alert and oriented ×3.  ENT: Ear exam shows slight bilateral tympanic membrane dullness and injection, the bilateral posterior nasal turbinates are injected and edematous with tender left maxillary sinuses, no pharyngeal injection with increased post nasal drainage noted.  Neck: supple with no adenoapthy.  Heart: Heart rate is regular without murmur.  Lungs: Lungs are clear to auscultation with good airflow bilaterally.  Skin: Skin is warm and dry without any rash noted.    Assessment/Plan   1. Acute bacterial sinusitis  azithromycin (ZITHROMAX) 250 MG tablet       Patient Instructions     See hand out for treatment tips.   Patient Education     Acute Bacterial Rhinosinusitis (ABRS)    Acute bacterial rhinosinusitis (ABRS) is an infection of your nasal cavity and sinuses. Its caused by bacteria. Acute means that youve had symptoms for less than 4 weeks, but possibly up to 12 weeks.  Understanding your sinuses  The nasal cavity is the large air-filled space behind your nose. The sinuses are a group of spaces formed by the bones of your face. They connect with your nasal cavity. ABRS causes the tissue lining these spaces to become inflamed. Mucus may not drain normally. This leads to facial pain and other symptoms.  What causes ABRS?  ABRS most often follows an upper respiratory infection caused by a virus. Bacteria then infect the lining of your nasal cavity and sinuses. But you can also get ABRS if you have:    Nasal allergies    Long-term nasal swelling and congestion not caused by allergies    Blockage in the nose  Symptoms of  ABRS  The symptoms of ABRS may be different for each person and include:    Nasal congestion or blockage    Pain or pressure in the face    Thick, colored drainage from the nose  Other symptoms may include:    Runny nose    Fluid draining from the nose down the throat (postnasal drip)    Headache    Cough    Pain    Fever  Diagnosing ABRS  ABRS may be diagnosed if youve had an upper respiratory infection like a cold and cough for 10 or more days without improvement or with worsening symptoms. Your healthcare provider will ask about your symptoms and your medical history. The provider will check your vital signs, including your temperature. Youll have a physical exam. The healthcare provider will check your ears, nose, and throat. You likely wont need any tests. If ABRS comes back, you may have a culture or other tests.  Treatment for ABRS  Treatment may include:    Antibiotic medicine. This is for symptoms that last for at least 10 to 14 days.    Nasal corticosteroid medicine. Drops or spray used in the nose can lessen swelling and congestion.    Over-the-counter pain medicine. This is to lessen sinus pain and pressure.    Nasal decongestant medicine. Spray or drops may help to lessen congestion. Do not use them for more than a few days.    Salt wash (saline irrigation). This can help to loosen mucus.  Possible complications of ABRS  ABRS may come back or become long-term (chronic). In rare cases, ABRS may cause complications such as:     Inflamed tissue around the brain and spinal cord (meningitis)    Inflamed tissue around the eyes (orbital cellulitis)    Inflamed bones around the sinuses (osteitis)  These problems may need to be treated in a hospital with intravenous (IV) antibiotic medicine or surgery.  When to call the healthcare provider  Call your healthcare provider if you have any of the following:    Symptoms that dont get better, or get worse    Symptoms that dont get better after 3 to 5 days on  antibiotics    Trouble seeing    Swelling around your eyes    Confusion or trouble staying awake   Date Last Reviewed: 5/1/2017 2000-2017 [employer]. [address] [place] [address], [place] [address]. All rights reserved. This information is not intended as a substitute for professional medical care. Always follow your healthcare professional's instructions.              Gualberto Deluna, DO

## 2021-06-30 NOTE — PROGRESS NOTES
"Progress Notes by Melia Reyes AuD at 5/21/2021  1:20 PM     Author: Melia Reyes AuD Service: -- Author Type: Audiologist    Filed: 5/21/2021  1:39 PM Encounter Date: 5/21/2021 Status: Signed    : Melia Reyes AuD (Audiologist)       Audiology Report    Summary: Audiology visit completed. Please see audiogram below or in \"media\" tab for case history and results.     Plan:  The patient is returned to ENT for follow up. Return for retesting with ENT recommendation.     Josue Lundy, [facility]  Clinical Audiologist  [place] #[id number]             "

## 2021-07-13 ENCOUNTER — RECORDS - HEALTHEAST (OUTPATIENT)
Dept: ADMINISTRATIVE | Facility: CLINIC | Age: 66
End: 2021-07-13

## 2021-07-21 ENCOUNTER — RECORDS - HEALTHEAST (OUTPATIENT)
Dept: ADMINISTRATIVE | Facility: CLINIC | Age: 66
End: 2021-07-21

## 2021-07-23 ENCOUNTER — RECORDS - HEALTHEAST (OUTPATIENT)
Dept: ADMINISTRATIVE | Facility: CLINIC | Age: 66
End: 2021-07-23

## 2021-08-22 ENCOUNTER — HEALTH MAINTENANCE LETTER (OUTPATIENT)
Age: 66
End: 2021-08-22

## 2021-10-17 ENCOUNTER — HEALTH MAINTENANCE LETTER (OUTPATIENT)
Age: 66
End: 2021-10-17

## 2021-10-26 NOTE — PROGRESS NOTES
Marlin Munoz is a 65 y.o. female who is being evaluated via a billable telephone visit.      What phone number would you like to be contacted at? [phone]  How would you like to obtain your AVS? AVS Preference: Mail a copy.    Assessment & Plan     Impaired hearing.  ENT consult.    Skin tags dermatology consult.    Review of external notes as documented in note  11 minutes spent on the date of the encounter doing chart review, patient visit and documentation        No follow-ups on file.    Mil Muhammad MD  [facility]   Marlin Munoz is 65 y.o. and presents today for the following health issues   HPI       Impaired hearing.    Skin tags need removal.  Wants referral for dermatology and ear nose and throat consultations.  Review of Systems  On direct questioning no chest pain or shortness of breath no blood in stool or urine medication list reviewed reconciled in the chart generally well-tolerated.  Non-smoker no excess alcohol.  Multiple drug allergies noted including aspirin cephalexin penicillin and sulfa.      Objective       Vitals:  No vitals were obtained today due to virtual visit.    Physical Exam  No physical examination was done as this was a virtual visit using a telephone.            Phone call duration: 11 minutes   Patient's wife left message requesting a call back from Jovanna to discuss some family commitments they have coming up.

## 2022-03-23 ENCOUNTER — MYC MEDICAL ADVICE (OUTPATIENT)
Dept: INTERNAL MEDICINE | Facility: CLINIC | Age: 67
End: 2022-03-23
Payer: MEDICARE

## 2022-03-23 DIAGNOSIS — T78.40XS ALLERGIC REACTION, SEQUELA: Primary | ICD-10-CM

## 2022-03-23 NOTE — TELEPHONE ENCOUNTER
Ericka ms:  Jose A BYRD  Hope all is well with you.  Back pre-COVID, I had a referral from you for some allergy testing.  The test was inconclusive, I was supposed to go back for an observation to see if I was allergic to shrimp, but the allergist needed to cancel.  Months went by, and then COVID hit.  So.  Picking up where I left off.  I'm hungry for shrimp and don't dare eat it til I'm sure.  I can't tell from my Medicare materials if I need a referral to the allergist of not....so I'm playing it safe and asking for one.  Thanks!    Order is em'd up for verification and approval, if appropriate    Lee Velasquez Jr., CMA on 3/23/2022 at 1:19 PM

## 2022-03-29 ENCOUNTER — ANCILLARY PROCEDURE (OUTPATIENT)
Dept: MAMMOGRAPHY | Facility: CLINIC | Age: 67
End: 2022-03-29
Attending: INTERNAL MEDICINE
Payer: MEDICARE

## 2022-03-29 DIAGNOSIS — Z12.31 VISIT FOR SCREENING MAMMOGRAM: ICD-10-CM

## 2022-03-29 PROCEDURE — 77067 SCR MAMMO BI INCL CAD: CPT

## 2022-04-22 ENCOUNTER — TRANSFERRED RECORDS (OUTPATIENT)
Dept: HEALTH INFORMATION MANAGEMENT | Facility: CLINIC | Age: 67
End: 2022-04-22
Payer: MEDICARE

## 2022-05-26 ENCOUNTER — OFFICE VISIT (OUTPATIENT)
Dept: ALLERGY | Facility: CLINIC | Age: 67
End: 2022-05-26
Attending: INTERNAL MEDICINE
Payer: MEDICARE

## 2022-05-26 VITALS
BODY MASS INDEX: 35.73 KG/M2 | DIASTOLIC BLOOD PRESSURE: 86 MMHG | OXYGEN SATURATION: 98 % | HEART RATE: 74 BPM | WEIGHT: 235 LBS | SYSTOLIC BLOOD PRESSURE: 174 MMHG

## 2022-05-26 DIAGNOSIS — T78.40XS ALLERGIC REACTION, SEQUELA: ICD-10-CM

## 2022-05-26 PROCEDURE — 99243 OFF/OP CNSLTJ NEW/EST LOW 30: CPT | Mod: 25 | Performed by: ALLERGY & IMMUNOLOGY

## 2022-05-26 PROCEDURE — 95004 PERQ TESTS W/ALRGNC XTRCS: CPT | Performed by: ALLERGY & IMMUNOLOGY

## 2022-05-26 NOTE — PROGRESS NOTES
Subjective       HPI     Chief complaint: Shrimp allergy    History of present illness: This is a pleasant 66-year-old woman seen previously by Dr. Ramsey.  I was asked to see her for evaluation of her shrimp allergy by Dr. Muhammad.  Patient states that for years ago she had Posta, all of oil, cracked pepper and shrimp.  When she was eating, she noted some tingling on her lips.  She did not think much of it but was not sure if it was an allergic reaction.  She was seen by Dr. Ramsey who tested her for shrimp, lobster and crab.  She had a faint positive to shrimp.  It was suggested to do an in office challenge.  She has eaten clams since that time without any incident.  She does have a known history of environmental allergies.  Specifically itchy, watery eyes runny nose.  She takes antihistamines for this.  She states she has seasonal allergies as well as perennial allergies.  She does note itchy mouth and throat with raw walnuts but this does not happen if the walnuts are cooked in an oven.    Past medical history: Fibromyalgia    Social history: She lives in a home with central air, non-smoker      Family history: Positive for asthma and her son, mother, sisters and children have allergy        Review of Systems   Constitutional, HEENT, cardiovascular, pulmonary, gi and gu systems are negative, except as otherwise noted.      Objective    BP (!) 174/86   Pulse 74   Wt 106.6 kg (235 lb)   SpO2 98%   BMI 35.73 kg/m    Body mass index is 35.73 kg/m .  Physical Exam        Gen: Pleasant female not in acute distress  HEENT: Eyes no erythema of the bulbar or palpebral conjunctiva, no edema. Ears: No deformities or lesions. Nose: No congestion,  Mouth: Throat clear,   Neck: No masses lesions or swelling  Respiratory: No coughing with breathing, no retractions  Lymph: No visible supraclavicular or cervical lymphadenopathy  Skin: No rashes or lesions  Psych: Alert and appropriate for age      5 percutaneous test were  placed to dust mites and shrimp.  Positive histamine control with a positive test to dust mites, DP at 3 mm.  Please see scanned photograph.    Impression report and plan:  1.  Adverse reactions to shrimp  2.  Environmental allergies  3.  Oral allergy syndrome.    Patient feels comfortable trying this at home.  We went over how she should do this.  If she has any symptoms at all, she should stop.  This could be oral allergy syndrome as she sounds like she got this with walnuts.  Happy to do this in the office if she so desires.  Follow as needed.

## 2022-05-26 NOTE — LETTER
5/26/2022         RE: Marlin Munoz  [address] [place] [address]        Dear Colleague,    Thank you for referring your patient, Marlin Munoz, to the [facility]. Please see a copy of my visit note below.        Subjective       HPI     Chief complaint: Shrimp allergy    History of present illness: This is a pleasant 66-year-old woman seen previously by Dr. Ramsey.  I was asked to see her for evaluation of her shrimp allergy by Dr. Muhammad.  Patient states that for years ago she had Posta, all of oil, cracked pepper and shrimp.  When she was eating, she noted some tingling on her lips.  She did not think much of it but was not sure if it was an allergic reaction.  She was seen by Dr. Ramsey who tested her for shrimp, lobster and crab.  She had a faint positive to shrimp.  It was suggested to do an in office challenge.  She has eaten clams since that time without any incident.  She does have a known history of environmental allergies.  Specifically itchy, watery eyes runny nose.  She takes antihistamines for this.  She states she has seasonal allergies as well as perennial allergies.  She does note itchy mouth and throat with raw walnuts but this does not happen if the walnuts are cooked in an oven.    Past medical history: Fibromyalgia    Social history: She lives in a home with central air, non-smoker      Family history: Positive for asthma and her son, mother, sisters and children have allergy        Review of Systems   Constitutional, HEENT, cardiovascular, pulmonary, gi and gu systems are negative, except as otherwise noted.      Objective    BP (!) 174/86   Pulse 74   Wt 106.6 kg (235 lb)   SpO2 98%   BMI 35.73 kg/m    Body mass index is 35.73 kg/m .  Physical Exam        Gen: Pleasant female not in acute distress  HEENT: Eyes no erythema of the bulbar or palpebral conjunctiva, no edema. Ears: No deformities or lesions. Nose: No congestion,  Mouth: Throat clear,    Neck: No masses lesions or swelling  Respiratory: No coughing with breathing, no retractions  Lymph: No visible supraclavicular or cervical lymphadenopathy  Skin: No rashes or lesions  Psych: Alert and appropriate for age      5 percutaneous test were placed to dust mites and shrimp.  Positive histamine control with a positive test to dust mites, DP at 3 mm.  Please see scanned photograph.    Impression report and plan:  1.  Adverse reactions to shrimp  2.  Environmental allergies  3.  Oral allergy syndrome.    Patient feels comfortable trying this at home.  We went over how she should do this.  If she has any symptoms at all, she should stop.  This could be oral allergy syndrome as she sounds like she got this with walnuts.  Happy to do this in the office if she so desires.  Follow as needed.      Again, thank you for allowing me to participate in the care of your patient.        Sincerely,        Dara GUERERRO MD

## 2022-11-19 ENCOUNTER — HEALTH MAINTENANCE LETTER (OUTPATIENT)
Age: 67
End: 2022-11-19

## 2023-01-13 ENCOUNTER — NURSE TRIAGE (OUTPATIENT)
Dept: NURSING | Facility: CLINIC | Age: 68
End: 2023-01-13

## 2023-01-13 ENCOUNTER — TELEPHONE (OUTPATIENT)
Dept: INTERNAL MEDICINE | Facility: CLINIC | Age: 68
End: 2023-01-13

## 2023-01-13 ENCOUNTER — OFFICE VISIT (OUTPATIENT)
Dept: FAMILY MEDICINE | Facility: CLINIC | Age: 68
End: 2023-01-13
Payer: MEDICARE

## 2023-01-13 VITALS
DIASTOLIC BLOOD PRESSURE: 88 MMHG | SYSTOLIC BLOOD PRESSURE: 142 MMHG | TEMPERATURE: 97.8 F | RESPIRATION RATE: 20 BRPM | HEART RATE: 80 BPM | OXYGEN SATURATION: 98 %

## 2023-01-13 DIAGNOSIS — J02.9 ACUTE VIRAL PHARYNGITIS: Primary | ICD-10-CM

## 2023-01-13 DIAGNOSIS — R07.0 THROAT PAIN: ICD-10-CM

## 2023-01-13 LAB
DEPRECATED S PYO AG THROAT QL EIA: NEGATIVE
GROUP A STREP BY PCR: NOT DETECTED

## 2023-01-13 PROCEDURE — 99213 OFFICE O/P EST LOW 20 MIN: CPT | Mod: CS | Performed by: NURSE PRACTITIONER

## 2023-01-13 PROCEDURE — U0003 INFECTIOUS AGENT DETECTION BY NUCLEIC ACID (DNA OR RNA); SEVERE ACUTE RESPIRATORY SYNDROME CORONAVIRUS 2 (SARS-COV-2) (CORONAVIRUS DISEASE [COVID-19]), AMPLIFIED PROBE TECHNIQUE, MAKING USE OF HIGH THROUGHPUT TECHNOLOGIES AS DESCRIBED BY CMS-2020-01-R: HCPCS | Performed by: NURSE PRACTITIONER

## 2023-01-13 PROCEDURE — U0005 INFEC AGEN DETEC AMPLI PROBE: HCPCS | Performed by: NURSE PRACTITIONER

## 2023-01-13 PROCEDURE — 87651 STREP A DNA AMP PROBE: CPT | Performed by: NURSE PRACTITIONER

## 2023-01-13 NOTE — TELEPHONE ENCOUNTER
Triage Call;     Pt calling because she thinks that she has tonsillitis     Sore throat   Ear ache in both ear  Sinus pressure  Swollen glands in her neck    Sore throat pain: 4/10    Disposition: See in office today. Pt was given the care advice for her sx and was transferred to scheduling to check appt availability. Pt was also given the [facility] information if there are no appts in clinic.     Sylvia Ford RN  [facility] Nurse Advisor 12:56 PM 1/13/2023        Reason for Disposition    Pus on tonsils (back of throat) and swollen neck lymph nodes ('glands')    Earache also present    Additional Information    Negative: SEVERE difficulty breathing (e.g., struggling for each breath, speaks in single words)    Negative: Sounds like a life-threatening emergency to the triager    Negative: Drooling or spitting out saliva (because can't swallow)    Negative: Unable to open mouth completely    Negative: Drinking very little and has signs of dehydration (e.g., no urine > 12 hours, very dry mouth, very lightheaded)    Negative: Patient sounds very sick or weak to the triager    Negative: Difficulty breathing (per caller) but not severe    Negative: Fever > 103 F (39.4 C)    Negative: Refuses to drink anything for > 12 hours    Negative: SEVERE sore throat pain    Negative: Throat culture results, call about    Negative: Productive cough is main symptom    Negative: Runny nose is main symptom    Protocols used: SORE THROAT-A-OH

## 2023-01-13 NOTE — PATIENT INSTRUCTIONS
Your rapid strep test is negative, we will contact you if your culture is positive and antibiotics are required.  COVID test is pending and will result into MyChart in the next 24 to 48 hours.  Please continue symptomatic measures for your viral pharyngitis symptoms such as warm fluids, honey and over-the-counter Tylenol/ibuprofen as needed for discomfort.  Please increase your water hydration and rest.

## 2023-01-13 NOTE — TELEPHONE ENCOUNTER
FYI - Status Update    Who is Calling: patient    Update: Looking for update from earlier call this morning.    Advised patient that Dr. Muhammad is out of the office and will return on Monday.    Accepted offer to speak to Triage Nurse.Call Transferred.

## 2023-01-13 NOTE — TELEPHONE ENCOUNTER
General Call      Reason for Call:  Pt feels like she may have tonsilitis or   Strep, sore throat with lots of white spots    Pharm:  [facility]    What are your questions or concerns:  n/a    Date of last appointment with provider: n/a    Could we send this information to you in [facility] or would you prefer to receive a phone call?:   Patient would prefer a phone call   Okay to leave a detailed message?: Yes at Cell number on file:    Telephone Information:   Mobile [phone]

## 2023-01-13 NOTE — PROGRESS NOTES
Patient presents with:  Pharyngitis: X yesterday. Pt states swollen, redness, noticed white spots. Possible tonsillitis. Intermittent cough, low grade fevers. No chills or body aches.    Clinical Decision Making: Focused exam positive for mild erythremia of pharynx with left greater than right adenopathy.  Mild nasal congestion present with bilateral clear breath sounds.  Rapid strep test negative, culture sent.  COVID PCR test pending.    Clinical presentation and medical decision making consistent with acute viral pharyngitis with strep culture and COVID testing pending.  Discussed symptomatic measures for viral pharyngitis with warm fluids consider honey with increased water intake, as well as OTC Tylenol/ibuprofen for pain.  Education provided patient verbalized understanding.      ICD-10-CM    1. Acute viral pharyngitis  J02.9       2. Throat pain  R07.0 Streptococcus A Rapid Screen w/Reflex to PCR - Clinic Collect     Group A Streptococcus PCR Throat Swab     Symptomatic COVID-19 Virus (Coronavirus) by PCR Nose          Patient Instructions   Your rapid strep test is negative, we will contact you if your culture is positive and antibiotics are required.  COVID test is pending and will result into MyChart in the next 24 to 48 hours.  Please continue symptomatic measures for your viral pharyngitis symptoms such as warm fluids, honey and over-the-counter Tylenol/ibuprofen as needed for discomfort.  Please increase your water hydration and rest.      HPI: Marlin Munoz is a 67 year old female who presents today complaining of sore throat, with redness and white spots noticed, intermittent dry cough along with low-grade subjective fevers.  Denies any chills or myalgias.  Denies any known ill contacts or COVID exposures.  Reports negative home COVID test x2 including today.  Patient is fully vaccinated and boosted x1.  Reports taking increased warm fluids, however no OTC ibuprofen/acetaminophen.  Patient reports  concern of possible strep pharyngitis, and has grandchildren visiting shortly would like screening today.    History obtained from the patient.    Problem List:  Pigmented Nevus  Hemangioma Capillary  Skin Neoplasm Of Uncertain Behavior  Seborrheic Keratosis  Acute Sinusitis  Fatigue  Subcutaneous Lipoma  Lipoma Of The Adipose Tissue      Past Medical History:   Diagnosis Date     Arthritis     osteoarthritis     Fatigue      Fibromyalgia      Hemangioma     capillary     Lipoma      Seborrheic keratoses      Sinusitis        Social History     Tobacco Use     Smoking status: Never     Smokeless tobacco: Never   Substance Use Topics     Alcohol use: Yes     Alcohol/week: 2.0 standard drinks       Review of Systems  As noted in HPI      Vitals:    01/13/23 1435   BP: (!) 142/88   BP Location: Left arm   Patient Position: Sitting   Cuff Size: Adult Regular   Pulse: 80   Resp: 20   Temp: 97.8  F (36.6  C)   TempSrc: Oral   SpO2: 98%       Physical Exam  Constitutional:       Appearance: Normal appearance. She is not ill-appearing or diaphoretic.   HENT:      Head: Normocephalic and atraumatic.      Right Ear: Tympanic membrane, ear canal and external ear normal.      Left Ear: Tympanic membrane, ear canal and external ear normal.      Nose: Congestion present. No rhinorrhea.      Mouth/Throat:      Mouth: Mucous membranes are moist.      Pharynx: Posterior oropharyngeal erythema present. No oropharyngeal exudate.   Neck:      Comments: Left greater than right  Cardiovascular:      Rate and Rhythm: Normal rate and regular rhythm.      Pulses: Normal pulses.      Heart sounds: Normal heart sounds.   Pulmonary:      Effort: Pulmonary effort is normal.      Breath sounds: Normal breath sounds.   Musculoskeletal:      Cervical back: Neck supple.   Lymphadenopathy:      Cervical: Cervical adenopathy present.   Skin:     General: Skin is warm.      Capillary Refill: Capillary refill takes less than 2 seconds.      Findings:  No rash.   Neurological:      Mental Status: She is alert and oriented to person, place, and time.   Psychiatric:         Behavior: Behavior normal.         Labs:  Results for orders placed or performed in visit on 01/13/23   Streptococcus A Rapid Screen w/Reflex to PCR - Clinic Collect     Status: Normal    Specimen: Throat; Swab   Result Value Ref Range    Group A Strep antigen Negative Negative         At the end of the encounter, I discussed results, diagnosis, medications. Discussed red flags for immediate return to clinic/ER, as well as indications for follow up if no improvement. Patient understood and agreed to plan.     YUNG Fuchs CNP

## 2023-01-14 LAB — SARS-COV-2 RNA RESP QL NAA+PROBE: NEGATIVE

## 2023-06-27 ENCOUNTER — ANCILLARY PROCEDURE (OUTPATIENT)
Dept: MAMMOGRAPHY | Facility: HOSPITAL | Age: 68
End: 2023-06-27
Attending: INTERNAL MEDICINE
Payer: MEDICARE

## 2023-06-27 DIAGNOSIS — Z12.31 VISIT FOR SCREENING MAMMOGRAM: ICD-10-CM

## 2023-06-27 PROCEDURE — 77067 SCR MAMMO BI INCL CAD: CPT

## 2023-06-28 ENCOUNTER — TRANSCRIBE ORDERS (OUTPATIENT)
Dept: OTHER | Age: 68
End: 2023-06-28

## 2023-06-28 ENCOUNTER — PATIENT OUTREACH (OUTPATIENT)
Dept: ONCOLOGY | Facility: CLINIC | Age: 68
End: 2023-06-28
Payer: MEDICARE

## 2023-06-28 DIAGNOSIS — Z80.1 FAMILY HX OF LUNG CANCER: ICD-10-CM

## 2023-06-28 DIAGNOSIS — Z80.3 FAMILY HISTORY OF BREAST CANCER: Primary | ICD-10-CM

## 2023-06-28 DIAGNOSIS — Z80.0 FAMILY HISTORY OF COLON CANCER: ICD-10-CM

## 2023-06-28 NOTE — PROGRESS NOTES
Writer received Cancer Risk Management Program referral, referred for:     Family Hx breast cancer, Family Hx colon cancer, Family Hx lung cancer     Referral recd by phone by Pt/ Requesting GC based on pre-Mammogram questionnaire and family Hx    Reviewed for appropriate plan, and sent to New Patient Scheduling for completion.

## 2023-07-01 ENCOUNTER — OFFICE VISIT (OUTPATIENT)
Dept: FAMILY MEDICINE | Facility: CLINIC | Age: 68
End: 2023-07-01
Payer: MEDICARE

## 2023-07-01 VITALS
SYSTOLIC BLOOD PRESSURE: 142 MMHG | BODY MASS INDEX: 35.09 KG/M2 | HEIGHT: 68 IN | HEART RATE: 78 BPM | RESPIRATION RATE: 20 BRPM | DIASTOLIC BLOOD PRESSURE: 85 MMHG | OXYGEN SATURATION: 98 % | WEIGHT: 231.5 LBS | TEMPERATURE: 98 F

## 2023-07-01 DIAGNOSIS — R07.0 THROAT PAIN: ICD-10-CM

## 2023-07-01 DIAGNOSIS — J01.90 ACUTE SINUSITIS WITH COEXISTING CONDITION, NEED PROPHYLACTIC TREATMENT: Primary | ICD-10-CM

## 2023-07-01 DIAGNOSIS — J30.89 NON-SEASONAL ALLERGIC RHINITIS DUE TO OTHER ALLERGIC TRIGGER: ICD-10-CM

## 2023-07-01 LAB
DEPRECATED S PYO AG THROAT QL EIA: NEGATIVE
GROUP A STREP BY PCR: NOT DETECTED

## 2023-07-01 PROCEDURE — 87651 STREP A DNA AMP PROBE: CPT | Performed by: PHYSICIAN ASSISTANT

## 2023-07-01 PROCEDURE — 99214 OFFICE O/P EST MOD 30 MIN: CPT | Performed by: PHYSICIAN ASSISTANT

## 2023-07-01 RX ORDER — FLUTICASONE PROPIONATE 50 MCG
2 SPRAY, SUSPENSION (ML) NASAL DAILY
Qty: 18.2 ML | Refills: 1 | Status: SHIPPED | OUTPATIENT
Start: 2023-07-01

## 2023-07-01 RX ORDER — AZITHROMYCIN 250 MG/1
TABLET, FILM COATED ORAL
Qty: 6 TABLET | Refills: 0 | Status: SHIPPED | OUTPATIENT
Start: 2023-07-01 | End: 2023-07-06

## 2023-07-01 RX ORDER — LEVOCETIRIZINE DIHYDROCHLORIDE 5 MG/1
5 TABLET, FILM COATED ORAL EVERY EVENING
Qty: 30 TABLET | Refills: 1 | Status: SHIPPED | OUTPATIENT
Start: 2023-07-01 | End: 2023-08-30
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
## 2023-07-01 NOTE — PATIENT INSTRUCTIONS
(J01.90) Acute sinusitis with coexisting condition, need prophylactic treatment  (primary encounter diagnosis)  Comment:   Plan: azithromycin (ZITHROMAX) 250 MG tablet            (R07.0) Throat pain  Comment:   Plan: Streptococcus A Rapid Screen w/Reflex to PCR -         Clinic Collect, Group A Streptococcus PCR         Throat Swab, levocetirizine (XYZAL) 5 MG tablet            (J30.89) Non-seasonal allergic rhinitis due to other allergic trigger  Comment:   Plan: fluticasone (FLONASE) 50 MCG/ACT nasal spray,         levocetirizine (XYZAL) 5 MG tablet          Use flonase and take xyzal nightly for tne next month.  TODAY use the Flonase twice.    Saline nasal spray in the morning.  (Ocean mist)

## 2023-07-11 ENCOUNTER — OFFICE VISIT (OUTPATIENT)
Dept: MIDWIFE SERVICES | Facility: CLINIC | Age: 68
End: 2023-07-11
Payer: MEDICARE

## 2023-07-11 VITALS
DIASTOLIC BLOOD PRESSURE: 78 MMHG | WEIGHT: 231 LBS | HEART RATE: 80 BPM | HEIGHT: 68 IN | SYSTOLIC BLOOD PRESSURE: 122 MMHG | BODY MASS INDEX: 35.01 KG/M2

## 2023-07-11 DIAGNOSIS — Z00.00 ROUTINE ADULT HEALTH MAINTENANCE: Primary | ICD-10-CM

## 2023-07-11 DIAGNOSIS — E66.09 OTHER OBESITY DUE TO EXCESS CALORIES: ICD-10-CM

## 2023-07-11 LAB
BASOPHILS # BLD AUTO: 0 10E3/UL (ref 0–0.2)
BASOPHILS NFR BLD AUTO: 0 %
EOSINOPHIL # BLD AUTO: 0.2 10E3/UL (ref 0–0.7)
EOSINOPHIL NFR BLD AUTO: 3 %
ERYTHROCYTE [DISTWIDTH] IN BLOOD BY AUTOMATED COUNT: 12.7 % (ref 10–15)
HCT VFR BLD AUTO: 40.4 % (ref 35–47)
HGB BLD-MCNC: 13.7 G/DL (ref 11.7–15.7)
IMM GRANULOCYTES # BLD: 0 10E3/UL
IMM GRANULOCYTES NFR BLD: 0 %
LYMPHOCYTES # BLD AUTO: 2.6 10E3/UL (ref 0.8–5.3)
LYMPHOCYTES NFR BLD AUTO: 34 %
MCH RBC QN AUTO: 30 PG (ref 26.5–33)
MCHC RBC AUTO-ENTMCNC: 33.9 G/DL (ref 31.5–36.5)
MCV RBC AUTO: 88 FL (ref 78–100)
MONOCYTES # BLD AUTO: 0.4 10E3/UL (ref 0–1.3)
MONOCYTES NFR BLD AUTO: 6 %
NEUTROPHILS # BLD AUTO: 4.3 10E3/UL (ref 1.6–8.3)
NEUTROPHILS NFR BLD AUTO: 57 %
PLATELET # BLD AUTO: 283 10E3/UL (ref 150–450)
RBC # BLD AUTO: 4.57 10E6/UL (ref 3.8–5.2)
WBC # BLD AUTO: 7.5 10E3/UL (ref 4–11)

## 2023-07-11 PROCEDURE — G0145 SCR C/V CYTO,THINLAYER,RESCR: HCPCS | Performed by: MIDWIFE

## 2023-07-11 PROCEDURE — 85025 COMPLETE CBC W/AUTO DIFF WBC: CPT | Performed by: MIDWIFE

## 2023-07-11 PROCEDURE — 87624 HPV HI-RISK TYP POOLED RSLT: CPT | Mod: GY | Performed by: MIDWIFE

## 2023-07-11 PROCEDURE — 82306 VITAMIN D 25 HYDROXY: CPT | Mod: GA | Performed by: MIDWIFE

## 2023-07-11 PROCEDURE — 99387 INIT PM E/M NEW PAT 65+ YRS: CPT | Performed by: MIDWIFE

## 2023-07-11 PROCEDURE — 36415 COLL VENOUS BLD VENIPUNCTURE: CPT | Mod: GA | Performed by: MIDWIFE
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
## 2023-07-12 ENCOUNTER — TRANSFERRED RECORDS (OUTPATIENT)
Dept: HEALTH INFORMATION MANAGEMENT | Facility: CLINIC | Age: 68
End: 2023-07-12
Payer: MEDICARE

## 2023-07-12 LAB
DEPRECATED CALCIDIOL+CALCIFEROL SERPL-MC: 24 UG/L (ref 20–75)
DEPRECATED CALCIDIOL+CALCIFEROL SERPL-MC: 25 UG/L (ref 20–75)

## 2023-07-14 LAB
BKR LAB AP GYN ADEQUACY: NORMAL
BKR LAB AP GYN INTERPRETATION: NORMAL
BKR LAB AP HPV REFLEX: NORMAL
BKR LAB AP LMP: NORMAL
BKR LAB AP PREVIOUS ABNORMAL: NORMAL
PATH REPORT.COMMENTS IMP SPEC: NORMAL
PATH REPORT.COMMENTS IMP SPEC: NORMAL
PATH REPORT.RELEVANT HX SPEC: NORMAL

## 2023-07-17 LAB
HUMAN PAPILLOMA VIRUS 16 DNA: NEGATIVE
HUMAN PAPILLOMA VIRUS 18 DNA: NEGATIVE
HUMAN PAPILLOMA VIRUS FINAL DIAGNOSIS: NORMAL
HUMAN PAPILLOMA VIRUS OTHER HR: NEGATIVE

## 2023-07-18 ENCOUNTER — VIRTUAL VISIT (OUTPATIENT)
Dept: ONCOLOGY | Facility: CLINIC | Age: 68
End: 2023-07-18
Attending: GENETIC COUNSELOR, MS
Payer: MEDICARE

## 2023-07-18 DIAGNOSIS — Z80.3 FAMILY HISTORY OF MALIGNANT NEOPLASM OF BREAST: Primary | ICD-10-CM

## 2023-07-18 DIAGNOSIS — Z80.1 FAMILY HISTORY OF LUNG CANCER: ICD-10-CM

## 2023-07-18 PROCEDURE — 96040 HC GENETIC COUNSELING, EACH 30 MINUTES: CPT | Mod: 95 | Performed by: GENETIC COUNSELOR, MS

## 2023-07-18 NOTE — NURSING NOTE
Is the patient currently in the state of [place]? YES    Visit mode:VIDEO    If the visit is dropped, the patient can be reconnected by: VIDEO VISIT: Text to cell phone: [phone]    Will anyone else be joining the visit? NO      How would you like to obtain your AVS? MyChart    Are changes needed to the allergy or medication list? NO    Reason for visit: Consult    EVER NOGUEIRA

## 2023-07-18 NOTE — PROGRESS NOTES
7/18/2023    Virtual Visit Details  Type of service:  Video Visit   Video Start Time: 12:06pm  Video End Time:12:53pm (45 minutes spent in consult)  Originating Location (pt. Location): Home  Distant Location (provider location):  Off-site  Platform used for Video Visit: Chase     Referring Provider: self-referred    Presenting Information:   Today Keyana elected for a virtual genetic counseling visit through the Cancer Risk Management Program to discuss her family history of cancer. We reviewed this history, cancer screening recommendations, and available genetic testing options.    Personal History:  Keyana is a 67 year old female. She does not have any personal history of cancer. 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    She had her first menstrual period at age 11, her first child at age 29, and went through menopause around age 46. Keyana reports that she had her uterus and ovaries removed in 2001 to address fibroids; her cervix remains in place. She reports that she has never used hormone replacement therapy.      She has annual mammograms and her most recent mammogram in June 2023 was normal. She reports that she had a breast biopsy in 2003 that was benign. She reports that her most recent colonoscopy was more than 10 years ago (possibly 2007) and was normal. Aside from regular dermatologic exams, she does not regularly do any other cancer screening at this time.    Family History: (Please see scanned pedigree for detailed family history information)    One sister is 74 and was recently diagnosed with breast cancer at age 74. She has reportedly pursued genetic testing, that was normal/negative. A copy of her sister's report was not available for review today, but Keyana shared that she may be able to get a copy of the report from her sister.    Keyana's mother was diagnosed with breast cancer in her 80's and again in  "her 90's (same breast, unsure if new primary versus recurrence), then passed away at age 101.    Keyana's maternal grandmother passed away in her 60's from \"female issues\", which may have been a gynecologic and/or stomach cancer. Additional details are uncertain.    Keyana's father was diagnosed with lung cancer and passed away at age 47. He smoked and was an  with known exposure to asbestos.    One paternal uncle was diagnosed with lung cancer and passed away in his 70's; he had a history of smoking.    One paternal uncle may have had a cancer before passing away at age 65.    One paternal aunt may have had a cancer before passing away in her 70's.    One paternal aunt had a colostomy bag and passed away in her 60's. Keyana was unsure if her aunt had a cancer, or another GI condition.    Her maternal ethnicity is [place]iny[place]. Her paternal ethnicity is Marshallese and Turkish. There is no known Ashkenazi Jew ancestry on either side of her family.    Discussion:    We discussed the features commonly associated with hereditary cancer syndromes, and a handout regarding this was provided to Keyana today. This can be found in the after visit summary.    As discussed in our session, her family history does not present with many of the features typically seen in high risk families:     Multiple generations of relatives diagnosed with related cancers     Relatives diagnosed with early-onset cancers (typically under age 50)    Relatives with rare cancers, bilateral cancers, and/or multiple primary cancers     We discussed that it is unlikely that there is a currently identifiable hereditary cancer syndrome present in Keyana espinosa. Insurance coverage for genetic testing is also dependent upon personal and family history being suggestive of a hereditary cancer syndrome.    We then reviewed that if her maternal grandmother was confirmed to have had a cancer, such as ovarian cancer, it may change the above interpretation. " That being said, her sister with breast cancer reportedly pursued genetic testing recently that was normal/negative. This likely significantly reduces, but does not eliminate, Keyana's risk to carry a gene mutation associated with cancer risk. We also discussed that obtaining a copy of her sister's test report would allow us to confirm that her sister's testing was both comprehensive and normal/negative. Keyana verbalized understanding and agreed to speak to her sister.    We also discussed that though her father was diagnosed with lung cancer under age 50 and she has several paternal relatives with potentially related cancers, these family members had significant environmental exposures (I.e. smoking, asbestos, factory work, etc.) associated with these cancers. Again, this likely significantly reduces, but does not eliminate, the chance for a paternal genetic risk for cancer. Keyana verbalized understanding.    That being said, we discussed that accurate cancer risk cannot always be established based upon the pedigree analysis alone. As such, the option remains for Keyana to consider panel genetic testing to analyze multiple genes at once. Several of the genetic testing labs do offer reduced self-pay options for genetic testing, if insurance coverage is uncertain.    Keyana shared that at this time she would feel comfortable declining genetic testing. We discussed the importance of Keyana contacting me if her personal or family history changes, though. This may modify our assessment regarding her risk for a hereditary cancer syndrome and/or genetic testing options.     Cancer screening recommendations based upon her personal/family history:    Based on her personal and family history, Keyana has a 14.3% (potentially as high as 18% given her prior breast biopsy) lifetime risk of developing breast cancer based on the EM 8 model. Therefore, Keyana does not meet current National Comprehensive Cancer Network (NCCN) guidelines for  high risk breast screening, which is offered to women with a 20% lifetime risk or higher. However, it is still important for Keyana to continue with routine annual breast screening under the care of her physicians.     Keyana s close maternal female relatives also remain at increased risk for breast cancer given their family history. Breast cancer screening is generally recommended to begin approximately 10 years younger than the earliest age of breast cancer diagnosis in the family, or at age 40, whichever comes first. In this family, screening may begin at age 40. Breast screening options should be discussed with an individual's primary care provider and a genetic counselor, to determine at what age to begin screening, what screening is appropriate, and if additional screening (such as breast MRI) is necessary based on personal/family history factors.    Other population cancer screening options, such as those recommended by the American Cancer Society and NCCN, are also appropriate for Keyana and her family. These screening recommendations may change if there are changes to Keyana personal and/or family history of cancer. Final screening recommendations should be made by each individual's primary care provider.    Plan:  1) Keyana elected to not have genetic testing at this time, but will contact me if she is interested in readdressing her genetic testing options.   2) Information regarding recommended cancer screening, based upon her family history, was reviewed for Keyana.  3) Keyana will also contact me regularly and/or if her family or personal history changes. My contact information was provided.     Mary Irizarry MS, [facility] [place]  Licensed, Certified Genetic Counselor  Office: [phone]  Pager: [phone]

## 2023-07-18 NOTE — LETTER
7/18/2023         RE: Marlin Munoz  [address] [place] [address]        Dear Colleague,    Thank you for referring your patient, Marlin Munoz, to the [facility]. Please see a copy of my visit note below.    7/18/2023    Virtual Visit Details  Type of service:  Video Visit   Video Start Time: 12:06pm  Video End Time:12:53pm (45 minutes spent in consult)  Originating Location (pt. Location): Home  Distant Location (provider location):  Off-site  Platform used for Video Visit: [employer]     Referring Provider: self-referred    Presenting Information:   Today Keyana elected for a virtual genetic counseling visit through the Cancer Risk Management Program to discuss her family history of cancer. We reviewed this history, cancer screening recommendations, and available genetic testing options.    Personal History:  Keyana is a 67 year old female. She does not have any personal history of cancer. 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    She had her first menstrual period at age 11, her first child at age 29, and went through menopause around age 46. Keyana reports that she had her uterus and ovaries removed in 2001 to address fibroids; her cervix remains in place. She reports that she has never used hormone replacement therapy.      She has annual mammograms and her most recent mammogram in June 2023 was normal. She reports that she had a breast biopsy in 2003 that was benign. She reports that her most recent colonoscopy was more than 10 years ago (possibly 2007) and was normal. Aside from regular dermatologic exams, she does not regularly do any other cancer screening at this time.    Family History: (Please see scanned pedigree for detailed family history information)  One sister is 74 and was recently diagnosed with breast cancer at age 74. She has reportedly pursued genetic  "testing, that was normal/negative. A copy of her sister's report was not available for review today, but Keyana shared that she may be able to get a copy of the report from her sister.  Keyana's mother was diagnosed with breast cancer in her 80's and again in her 90's (same breast, unsure if new primary versus recurrence), then passed away at age 101.  Keyana's maternal grandmother passed away in her 60's from \"female issues\", which may have been a gynecologic and/or stomach cancer. Additional details are uncertain.  Keyana's father was diagnosed with lung cancer and passed away at age 47. He smoked and was an  with known exposure to asbestos.  One paternal uncle was diagnosed with lung cancer and passed away in his 70's; he had a history of smoking.  One paternal uncle may have had a cancer before passing away at age 65.  One paternal aunt may have had a cancer before passing away in her 70's.  One paternal aunt had a colostomy bag and passed away in her 60's. Keyana was unsure if her aunt had a cancer, or another GI condition.  Her maternal ethnicity is [place]i. Her paternal ethnicity is [place] and [place]. There is no known Ashkenazi Orthodox ancestry on either side of her family.    Discussion:  We discussed the features commonly associated with hereditary cancer syndromes, and a handout regarding this was provided to Keyana today. This can be found in the after visit summary.  As discussed in our session, her family history does not present with many of the features typically seen in high risk families:   Multiple generations of relatives diagnosed with related cancers   Relatives diagnosed with early-onset cancers (typically under age 50)  Relatives with rare cancers, bilateral cancers, and/or multiple primary cancers   We discussed that it is unlikely that there is a currently identifiable hereditary cancer syndrome present in Keyana family. Insurance coverage for genetic testing is also dependent upon " personal and family history being suggestive of a hereditary cancer syndrome.  We then reviewed that if her maternal grandmother was confirmed to have had a cancer, such as ovarian cancer, it may change the above interpretation. That being said, her sister with breast cancer reportedly pursued genetic testing recently that was normal/negative. This likely significantly reduces, but does not eliminate, Keyana's risk to carry a gene mutation associated with cancer risk. We also discussed that obtaining a copy of her sister's test report would allow us to confirm that her sister's testing was both comprehensive and normal/negative. Keyana verbalized understanding and agreed to speak to her sister.  We also discussed that though her father was diagnosed with lung cancer under age 50 and she has several paternal relatives with potentially related cancers, these family members had significant environmental exposures (I.e. smoking, asbestos, factory work, etc.) associated with these cancers. Again, this likely significantly reduces, but does not eliminate, the chance for a paternal genetic risk for cancer. Keyana verbalized understanding.  That being said, we discussed that accurate cancer risk cannot always be established based upon the pedigree analysis alone. As such, the option remains for Keyana to consider panel genetic testing to analyze multiple genes at once. Several of the genetic testing labs do offer reduced self-pay options for genetic testing, if insurance coverage is uncertain.  Keyana shared that at this time she would feel comfortable declining genetic testing. We discussed the importance of Keyana contacting me if her personal or family history changes, though. This may modify our assessment regarding her risk for a hereditary cancer syndrome and/or genetic testing options.   Cancer screening recommendations based upon her personal/family history:  Based on her personal and family history, Keyana has a 14.3%  (potentially as high as 18% given her prior breast biopsy) lifetime risk of developing breast cancer based on the EM 8 model. Therefore, Keyana does not meet current National Comprehensive Cancer Network (NCCN) guidelines for high risk breast screening, which is offered to women with a 20% lifetime risk or higher. However, it is still important for Keyana to continue with routine annual breast screening under the care of her physicians.   Keyana s close maternal female relatives also remain at increased risk for breast cancer given their family history. Breast cancer screening is generally recommended to begin approximately 10 years younger than the earliest age of breast cancer diagnosis in the family, or at age 40, whichever comes first. In this family, screening may begin at age 40. Breast screening options should be discussed with an individual's primary care provider and a genetic counselor, to determine at what age to begin screening, what screening is appropriate, and if additional screening (such as breast MRI) is necessary based on personal/family history factors.  Other population cancer screening options, such as those recommended by the American Cancer Society and NCCN, are also appropriate for Keyana and her family. These screening recommendations may change if there are changes to Keyana personal and/or family history of cancer. Final screening recommendations should be made by each individual's primary care provider.    Plan:  1) Keyana elected to not have genetic testing at this time, but will contact me if she is interested in readdressing her genetic testing options.   2) Information regarding recommended cancer screening, based upon her family history, was reviewed for Keyana.  3) Keyana will also contact me regularly and/or if her family or personal history changes. My contact information was provided.     Mary Irizarry MS, [facility] [place]  Licensed, Certified Genetic Counselor  Office: [phone]    Pager:  [phone]

## 2023-07-19 NOTE — PATIENT INSTRUCTIONS
Assessing Cancer Risk  Cancer is a common diagnosis which impacts many families. Individuals may develop cancer due to environmental factors (such as exposures and lifestyle), aging, genetic predisposition, or a combination of these factors. The vast majority of cancer diagnoses are considered sporadic, and not primarily due to an inherited risk factor. Approximately 5-10% of cancer diagnoses are thought to be caused by inherited risk factors.       There are several features that are likely to be present in a family that has an inherited alteration in a cancer susceptibility gene. However, this may not be the case for all families that carry a cancer risk gene, such as those with small family size or limited history information.  Cancers diagnosed at a young age (often before age 50)  Individuals with more than one primary cancer  Certain rare tumors/cancers  Multiple generations of the family affected with cancer    Categories of Cancer        Cancers may be:  Sporadic: somatic (acquired) genetic errors, environmental exposures, and lifestyle contribute to cancer as we age.  Familial: clustering of cancer in a family due to a combination of multiple genetic and shared environmental factors.  Hereditary: inherited risk for cancer, typically in a single gene.      Cancer Screening and Management  Cancer risk, as well as screening and management recommendations, are based on a combination of factors. This includes both personal and family history factors. Population cancer screening options, such as those recommended by the American Cancer Society and the National Comprehensive Cancer Network (NCCN), are appropriate for many families at average risk for cancer. However, earlier and/or more frequent screening may be recommended based on personal factors (lifestyle, exposures, medications, screening results), family history of cancer, and sometimes genetic factors. These cancer risk management options should be  discussed in more detail with an individual's medical providers.    Resources  National Society of Genetic Counselors nsgc.org   American Cancer Society cancer.org     Please call us if you have any questions or concerns.   Cancer Risk Management Program (Appointments: [phone])  Art Cha, MS, [facility] [phone]  Charlene Del Rosario, MS, [facility] [phone]  Maria Isabel Mooney, MS, [facility]  [phone]  Aster Justice, MS, [facility]  [phone]  Elizabeth Montoya, MS, [facility]  [phone]  Mary Irizarry, MS, [facility] [phone]  Charmaine Hernandez, MS, [facility] [phone]

## 2023-07-21 ASSESSMENT — ENCOUNTER SYMPTOMS
HEARTBURN: 0
HEMATOCHEZIA: 0
NAUSEA: 0
PALPITATIONS: 0
CHILLS: 0
MYALGIAS: 1
WEAKNESS: 0
DYSURIA: 0
DIZZINESS: 0
EYE PAIN: 0
FEVER: 0
HEADACHES: 0
SORE THROAT: 0
ARTHRALGIAS: 1
PARESTHESIAS: 0
NERVOUS/ANXIOUS: 0
COUGH: 0
SHORTNESS OF BREATH: 0
CONSTIPATION: 0
DIARRHEA: 0
ABDOMINAL PAIN: 0
FREQUENCY: 0
BREAST MASS: 0
HEMATURIA: 0
JOINT SWELLING: 0

## 2023-07-21 ASSESSMENT — ACTIVITIES OF DAILY LIVING (ADL): CURRENT_FUNCTION: NO ASSISTANCE NEEDED

## 2023-07-24 ENCOUNTER — OFFICE VISIT (OUTPATIENT)
Dept: FAMILY MEDICINE | Facility: CLINIC | Age: 68
End: 2023-07-24
Payer: MEDICARE

## 2023-07-24 ENCOUNTER — LAB (OUTPATIENT)
Dept: FAMILY MEDICINE | Facility: CLINIC | Age: 68
End: 2023-07-24

## 2023-07-24 VITALS
BODY MASS INDEX: 35.31 KG/M2 | WEIGHT: 233 LBS | HEIGHT: 68 IN | HEART RATE: 78 BPM | OXYGEN SATURATION: 97 % | SYSTOLIC BLOOD PRESSURE: 144 MMHG | RESPIRATION RATE: 18 BRPM | DIASTOLIC BLOOD PRESSURE: 83 MMHG | TEMPERATURE: 97.6 F

## 2023-07-24 DIAGNOSIS — R03.0 ELEVATED BLOOD PRESSURE READING WITHOUT DIAGNOSIS OF HYPERTENSION: ICD-10-CM

## 2023-07-24 DIAGNOSIS — Z91.09 ENVIRONMENTAL ALLERGIES: ICD-10-CM

## 2023-07-24 DIAGNOSIS — Z78.0 ENCOUNTER FOR OSTEOPOROSIS SCREENING IN ASYMPTOMATIC POSTMENOPAUSAL PATIENT: ICD-10-CM

## 2023-07-24 DIAGNOSIS — Z23 NEED FOR PROPHYLACTIC VACCINATION WITH COMBINED DIPHTHERIA-TETANUS-PERTUSSIS (DTP) VACCINE: ICD-10-CM

## 2023-07-24 DIAGNOSIS — M79.7 FIBROMYALGIA: ICD-10-CM

## 2023-07-24 DIAGNOSIS — T78.1XXD POLLEN-FOOD ALLERGY, SUBSEQUENT ENCOUNTER: ICD-10-CM

## 2023-07-24 DIAGNOSIS — R10.9 ABDOMINAL DISCOMFORT: ICD-10-CM

## 2023-07-24 DIAGNOSIS — Z00.00 ENCOUNTER FOR MEDICARE ANNUAL WELLNESS EXAM: ICD-10-CM

## 2023-07-24 DIAGNOSIS — Z12.11 SCREEN FOR COLON CANCER: ICD-10-CM

## 2023-07-24 DIAGNOSIS — Z13.820 ENCOUNTER FOR OSTEOPOROSIS SCREENING IN ASYMPTOMATIC POSTMENOPAUSAL PATIENT: ICD-10-CM

## 2023-07-24 DIAGNOSIS — Z23 NEED FOR SHINGLES VACCINE: ICD-10-CM

## 2023-07-24 DIAGNOSIS — Z11.59 NEED FOR HEPATITIS C SCREENING TEST: ICD-10-CM

## 2023-07-24 LAB
ALBUMIN SERPL BCG-MCNC: 4.1 G/DL (ref 3.5–5.2)
ALP SERPL-CCNC: 86 U/L (ref 35–104)
ALT SERPL W P-5'-P-CCNC: NORMAL U/L
ANION GAP SERPL CALCULATED.3IONS-SCNC: 12 MMOL/L (ref 7–15)
AST SERPL W P-5'-P-CCNC: NORMAL U/L
BILIRUB SERPL-MCNC: 0.3 MG/DL
BUN SERPL-MCNC: 12.7 MG/DL (ref 8–23)
CALCIUM SERPL-MCNC: 8.9 MG/DL (ref 8.8–10.2)
CHLORIDE SERPL-SCNC: 105 MMOL/L (ref 98–107)
CHOLEST SERPL-MCNC: 255 MG/DL
CREAT SERPL-MCNC: 0.77 MG/DL (ref 0.51–0.95)
DEPRECATED HCO3 PLAS-SCNC: 26 MMOL/L (ref 22–29)
ERYTHROCYTE [DISTWIDTH] IN BLOOD BY AUTOMATED COUNT: 12.7 % (ref 10–15)
GFR SERPL CREATININE-BSD FRML MDRD: 84 ML/MIN/1.73M2
GLUCOSE SERPL-MCNC: 96 MG/DL (ref 70–99)
HBA1C MFR BLD: 6.2 % (ref 0–5.6)
HCT VFR BLD AUTO: 40.7 % (ref 35–47)
HDLC SERPL-MCNC: 43 MG/DL
HGB BLD-MCNC: 13.3 G/DL (ref 11.7–15.7)
LDLC SERPL CALC-MCNC: ABNORMAL MG/DL
LDLC SERPL DIRECT ASSAY-MCNC: 156 MG/DL
MCH RBC QN AUTO: 28.6 PG (ref 26.5–33)
MCHC RBC AUTO-ENTMCNC: 32.7 G/DL (ref 31.5–36.5)
MCV RBC AUTO: 88 FL (ref 78–100)
NONHDLC SERPL-MCNC: 212 MG/DL
PLATELET # BLD AUTO: 300 10E3/UL (ref 150–450)
POTASSIUM SERPL-SCNC: 4 MMOL/L (ref 3.4–5.3)
PROT SERPL-MCNC: 7.2 G/DL (ref 6.4–8.3)
RBC # BLD AUTO: 4.65 10E6/UL (ref 3.8–5.2)
SODIUM SERPL-SCNC: 143 MMOL/L (ref 136–145)
TRIGL SERPL-MCNC: 560 MG/DL
WBC # BLD AUTO: 7.5 10E3/UL (ref 4–11)

## 2023-07-24 PROCEDURE — 80061 LIPID PANEL: CPT | Performed by: INTERNAL MEDICINE

## 2023-07-24 PROCEDURE — 84155 ASSAY OF PROTEIN SERUM: CPT | Performed by: INTERNAL MEDICINE

## 2023-07-24 PROCEDURE — 80048 BASIC METABOLIC PNL TOTAL CA: CPT | Performed by: INTERNAL MEDICINE

## 2023-07-24 PROCEDURE — 85027 COMPLETE CBC AUTOMATED: CPT | Performed by: INTERNAL MEDICINE

## 2023-07-24 PROCEDURE — 84075 ASSAY ALKALINE PHOSPHATASE: CPT | Performed by: INTERNAL MEDICINE

## 2023-07-24 PROCEDURE — G0438 PPPS, INITIAL VISIT: HCPCS | Performed by: INTERNAL MEDICINE

## 2023-07-24 PROCEDURE — 83036 HEMOGLOBIN GLYCOSYLATED A1C: CPT | Performed by: INTERNAL MEDICINE

## 2023-07-24 PROCEDURE — 36415 COLL VENOUS BLD VENIPUNCTURE: CPT | Performed by: INTERNAL MEDICINE

## 2023-07-24 PROCEDURE — 82040 ASSAY OF SERUM ALBUMIN: CPT | Performed by: INTERNAL MEDICINE

## 2023-07-24 PROCEDURE — 83721 ASSAY OF BLOOD LIPOPROTEIN: CPT | Mod: 59 | Performed by: INTERNAL MEDICINE

## 2023-07-24 PROCEDURE — 86803 HEPATITIS C AB TEST: CPT | Performed by: INTERNAL MEDICINE

## 2023-07-24 PROCEDURE — 82247 BILIRUBIN TOTAL: CPT | Performed by: INTERNAL MEDICINE

## 2023-07-24 ASSESSMENT — ENCOUNTER SYMPTOMS
DYSURIA: 0
HEMATURIA: 0
CHILLS: 0
JOINT SWELLING: 0
HEARTBURN: 0
WEAKNESS: 0
SHORTNESS OF BREATH: 0
PARESTHESIAS: 0
HEADACHES: 0
EYE PAIN: 0
ABDOMINAL PAIN: 0
ARTHRALGIAS: 1
COUGH: 0
DIZZINESS: 0
FREQUENCY: 0
NERVOUS/ANXIOUS: 0
SORE THROAT: 0
FEVER: 0
HEMATOCHEZIA: 0
MYALGIAS: 1
BREAST MASS: 0
NAUSEA: 0
CONSTIPATION: 0
PALPITATIONS: 0
DIARRHEA: 0

## 2023-07-24 ASSESSMENT — ACTIVITIES OF DAILY LIVING (ADL): CURRENT_FUNCTION: NO ASSISTANCE NEEDED

## 2023-07-24 ASSESSMENT — PAIN SCALES - GENERAL: PAINLEVEL: NO PAIN (0)

## 2023-07-24 NOTE — PATIENT INSTRUCTIONS
- I recommend getting your TDaP, PCV20 (pneumonia shot), shingles shot, and the updated COVID shot when you get a chance (retail pharmacy)    Patient Education   Personalized Prevention Plan  You are due for the preventive services outlined below.  Your care team is available to assist you in scheduling these services.  If you have already completed any of these items, please share that information with your care team to update in your medical record.  Health Maintenance Due   Topic Date Due    ANNUAL REVIEW OF HM ORDERS  Never done    Discuss Advance Care Planning  Never done    Colorectal Cancer Screening  Never done    Hepatitis C Screening  Never done    Cholesterol Lab  Never done    Zoster (Shingles) Vaccine (1 of 2) Never done    Pneumococcal Vaccine (1 - PCV) Never done    Diptheria Tetanus Pertussis (DTAP/TDAP/TD) Vaccine (2 - Td or Tdap) 09/03/2019    Osteoporosis Screening  09/06/2021    COVID-19 Vaccine (5 - Moderna series) 02/23/2023     Your Health Risk Assessment indicates you feel you are not in good health    A healthy lifestyle helps keep the body fit and the mind alert. It helps protect you from disease, helps you fight disease, and helps prevent chronic disease (disease that doesn't go away) from getting worse. This is important as you get older and begin to notice twinges in muscles and joints and a decline in the strength and stamina you once took for granted. A healthy lifestyle includes good healthcare, good nutrition, weight control, recreation, and regular exercise. Avoid harmful substances and do what you can to keep safe. Another part of a healthy lifestyle is stay mentally active and socially involved.    Good healthcare   Have a wellness visit every year.   If you have new symptoms, let us know right away. Don't wait until the next checkup.   Take medicines exactly as prescribed and keep your medicines in a safe place. Tell us if your medicine causes problems.   Healthy diet and weight  control   Eat 3 or 4 small, nutritious, low-fat, high-fiber meals a day. Include a variety of fruits, vegetables, and whole-grain foods.   Make sure you get enough calcium in your diet. Calcium, vitamin D, and exercise help prevent osteoporosis (bone thinning).   If you live alone, try eating with others when you can. That way you get a good meal and have company while you eat it.   Try to keep a healthy weight. If you eat more calories than your body uses for energy, it will be stored as fat and you will gain weight.     Recreation   Recreation is not limited to sports and team events. It includes any activity that provides relaxation, interest, enjoyment, and exercise. Recreation provides an outlet for physical, mental, and social energy. It can give a sense of worth and achievement. It can help you stay healthy.    Mental Exercise and Social Involvement  Mental and emotional health is as important as physical health. Keep in touch with friends and family. Stay as active as possible. Continue to learn and challenge yourself.   Things you can do to stay mentally active are:  Learn something new, like a foreign language or musical instrument.   Play S[employer]ABBLE or do crossword puzzles. If you cannot find people to play these games with you at home, you can play them with others on your computer through the Internet.   Join a games club--anything from card games to chess or checkers or lawn bowling.   Start a new hobby.   Go back to school.   Volunteer.   Read.   Keep up with world events.  Bladder Training: Care Instructions  Your Care Instructions     Bladder training is used to treat urge incontinence and stress incontinence. Urge incontinence means that the need to urinate comes on so fast that you can't get to a toilet in time. Stress incontinence means that you leak urine because of pressure on your bladder. For example, it may happen when you laugh, cough, or lift something heavy.  Bladder training can increase  how long you can wait before you have to urinate. It can also help your bladder hold more urine. And it can give you better control over the urge to urinate.  It is important to remember that bladder training takes a few weeks to a few months to make a difference. You may not see results right away, but don't give up.  Follow-up care is a key part of your treatment and safety. Be sure to make and go to all appointments, and call your doctor if you are having problems. It's also a good idea to know your test results and keep a list of the medicines you take.  How can you care for yourself at home?  Work with your doctor to come up with a bladder training program that is right for you. You may use one or more of the following methods.  Delayed urination  In the beginning, try to keep from urinating for 5 minutes after you first feel the need to go.  While you wait, take deep, slow breaths to relax. Kegel exercises can also help you delay the need to go to the bathroom.  After some practice, when you can easily wait 5 minutes to urinate, try to wait 10 minutes before you urinate.  Slowly increase the waiting period until you are able to control when you have to urinate.  Scheduled urination  Empty your bladder when you first wake up in the morning.  Schedule times throughout the day when you will urinate.  Start by going to the bathroom every hour, even if you don't need to go.  Slowly increase the time between trips to the bathroom.  When you have found a schedule that works well for you, keep doing it.  If you wake up during the night and have to urinate, do it. Apply your schedule to waking hours only.  Kegel exercises  These tighten and strengthen pelvic muscles, which can help you control the flow of urine. (If doing these exercises causes pain, stop doing them and talk with your doctor.) To do Kegel exercises:  Squeeze your muscles as if you were trying not to pass gas. Or squeeze your muscles as if you were  "stopping the flow of urine. Your belly, legs, and buttocks shouldn't move.  Hold the squeeze for 3 seconds, then relax for 5 to 10 seconds.  Start with 3 seconds, then add 1 second each week until you are able to squeeze for 10 seconds.  Repeat the exercise 10 times a session. Do 3 to 8 sessions a day.  When should you call for help?  Watch closely for changes in your health, and be sure to contact your doctor if:    Your incontinence is getting worse.     You do not get better as expected.   Where can you learn more?  Go to https://[email or web address].net/patiented  Enter V684 in the search box to learn more about \"Bladder Training: Care Instructions.\"  Current as of: March 1, 2023               Content Version: 13.7    6172-5448 [employer].   Care instructions adapted under license by your healthcare professional. If you have questions about a medical condition or this instruction, always ask your healthcare professional. [employer] disclaims any warranty or liability for your use of this information.         "

## 2023-07-24 NOTE — PROGRESS NOTES
"SUBJECTIVE:   Keyana is a 67 year old who presents for Preventive Visit.      7/24/2023     3:00 PM   Additional Questions   Roomed by vianey and Tg   Accompanied by alone     Are you in the first 12 months of your Medicare coverage?  Yes,  Visual Acuity:  Right Eye: 20/25   Left Eye: 20/25  Both Eyes: 20/25    Healthy Habits:     In general, how would you rate your overall health?  Fair    Frequency of exercise:  2-3 days/week    Duration of exercise:  15-30 minutes    Do you usually eat at least 4 servings of fruit and vegetables a day, include whole grains    & fiber and avoid regularly eating high fat or \"junk\" foods?  Yes    Taking medications regularly:  Yes    Medication side effects:  Not applicable    Ability to successfully perform activities of daily living:  No assistance needed    Home Safety:  No safety concerns identified    Hearing Impairment:  No hearing concerns    In the past 6 months, have you been bothered by leaking of urine? Yes    In general, how would you rate your overall mental or emotional health?  Good    Additional concerns today:  Yes        Have you ever done Advance Care Planning? (For example, a Health Directive, POLST, or a discussion with a medical provider or your loved ones about your wishes): info given to pt    Fall risk  Fallen 2 or more times in the past year?: No  Any fall with injury in the past year?: No    Cognitive Screening   1) Repeat 3 items (Leader, Season, Table)    2) Clock draw: NORMAL  3) 3 item recall: Recalls 3 objects  Results: 3 items recalled: COGNITIVE IMPAIRMENT LESS LIKELY    Mini-CogTM Copyright CARLEE Marrero. Licensed by the author for use in [facility]; reprinted with permission (roger@.[facility]). All rights reserved.      Do you have sleep apnea, excessive snoring or daytime drowsiness? : yes    Some snoring and some daytime drowsiness depending on the day    Reviewed and updated as needed this visit by clinical staff   Tobacco  Allergies  Meds "  Problems  Med Hx  Surg Hx  Fam Hx          Reviewed and updated as needed this visit by Provider   Tobacco  Allergies  Meds  Problems  Med Hx  Surg Hx  Fam Hx         Social History     Tobacco Use     Smoking status: Never     Passive exposure: Past (dad smoked until 1963)     Smokeless tobacco: Never   Substance Use Topics     Alcohol use: Not Currently     Comment: 0-1 drinks/week           7/21/2023     3:28 PM   Alcohol Use   Prescreen: >3 drinks/day or >7 drinks/week? No     Do you have a current opioid prescription? No  Do you use any other controlled substances or medications that are not prescribed by a provider? None          Has fibromyalgia, has chronic muscle pain that she attributes most of to fibromyalgia.  \For the fibromyalgia flares- that seems to be happening less than it used to be- stress has reduced substantially.       Has ongoing stomach issues.  Years ago was tested for food intolerances (many times).  Tries to stay away from known triggers, but there are times when she doesn't know what will trigger her symptoms.  Will sometimes find that she is bloated and queasy and sometimes results in diarrhea.  Has a son with IBS  (she thinks he has been tested for celiac disease).      Sister had genetic testing last week (new diagnosis of breast cancer); Keyana is in moderate risk category.      Current providers sharing in care for this patient include:   Patient Care Team:  Mil Muhammad MD as PCP - General (Internal Medicine)  Dara Lopez MD as Assigned Allergy Provider  Mary Irizarry GC as Genetic Counselor (Genetic Counselor, MS)    The following health maintenance items are reviewed in Epic and correct as of today:  Health Maintenance   Topic Date Due     ADVANCE CARE PLANNING  Never done     COLORECTAL CANCER SCREENING  Never done     HEPATITIS C SCREENING  Never done     LIPID  Never done     ZOSTER IMMUNIZATION (1 of 2) Never done     Pneumococcal Vaccine:  65+ Years (1 - PCV) Never done     DTAP/TDAP/TD IMMUNIZATION (2 - Td or Tdap) 09/03/2019     DEXA  09/06/2021     COVID-19 Vaccine (5 - Moderna series) 02/23/2023     INFLUENZA VACCINE (1) 09/01/2023     MEDICARE ANNUAL WELLNESS VISIT  07/24/2024     ANNUAL REVIEW OF HM ORDERS  07/24/2024     FALL RISK ASSESSMENT  07/24/2024     MAMMO SCREENING  06/27/2025     PAP FOLLOW-UP  07/11/2028     HPV FOLLOW-UP  07/11/2028     PHQ-2 (once per calendar year)  Completed     IPV IMMUNIZATION  Aged Out     MENINGITIS IMMUNIZATION  Aged Out     Lab work is in process      FHS-7:       3/29/2022     8:25 AM 6/27/2023     8:30 AM 7/21/2023     3:30 PM   Breast CA Risk Assessment (FHS-7)   Did any of your first-degree relatives have breast or ovarian cancer? Yes Yes Yes   Did any of your relatives have bilateral breast cancer? No Unknown No   Did any man in your family have breast cancer? No No No   Did any woman in your family have breast and ovarian cancer? No No No   Did any woman in your family have breast cancer before age 50 y? No No No   Do you have 2 or more relatives with breast and/or ovarian cancer? No Yes Yes   Do you have 2 or more relatives with breast and/or bowel cancer? No Yes Yes       Mammogram Screening: Recommended mammography every 1-2 years with patient discussion and risk factor consideration  Pertinent mammograms are reviewed under the imaging tab.    Review of Systems   Constitutional:  Negative for chills and fever.   HENT:  Negative for congestion, ear pain, hearing loss and sore throat.    Eyes:  Negative for pain and visual disturbance.   Respiratory:  Negative for cough and shortness of breath.    Cardiovascular:  Negative for chest pain, palpitations and peripheral edema.   Gastrointestinal:  Negative for abdominal pain, constipation, diarrhea, heartburn, hematochezia and nausea.   Breasts:  Negative for tenderness, breast mass and discharge.   Genitourinary:  Negative for dysuria, frequency, genital  "sores, hematuria, pelvic pain, urgency, vaginal bleeding and vaginal discharge.   Musculoskeletal:  Positive for arthralgias and myalgias. Negative for joint swelling.   Skin:  Negative for rash.   Neurological:  Negative for dizziness, weakness, headaches and paresthesias.   Psychiatric/Behavioral:  Negative for mood changes. The patient is not nervous/anxious.      Constitutional, HEENT, cardiovascular, pulmonary, gi and gu systems are negative, except as otherwise noted.    OBJECTIVE:   BP (!) 144/83 (BP Location: Right arm, Patient Position: Sitting, Cuff Size: Adult Large)   Pulse 78   Temp 97.6  F (36.4  C) (Temporal)   Resp 18   Ht 1.725 m (5' 7.91\")   Wt 105.7 kg (233 lb)   SpO2 97%   BMI 35.52 kg/m   Estimated body mass index is 35.52 kg/m  as calculated from the following:    Height as of this encounter: 1.725 m (5' 7.91\").    Weight as of this encounter: 105.7 kg (233 lb).  Physical Exam  GENERAL APPEARANCE: healthy, alert and no distress  EYES: Eyes grossly normal to inspection, PERRL and conjunctivae and sclerae normal  HENT: ear canals and TM's normal, nose and mouth without ulcers or lesions, oropharynx clear and oral mucous membranes moist  NECK: no adenopathy, no asymmetry, masses, or scars and thyroid normal to palpation  RESP: lungs clear to auscultation - no rales, rhonchi or wheezes  BREAST: normal without masses, tenderness or nipple discharge and no palpable axillary masses or adenopathy  CV: regular rate and rhythm, normal S1 S2, no S3 or S4, no murmur, click or rub, no peripheral edema and peripheral pulses strong  ABDOMEN: soft, nontender, no hepatosplenomegaly, no masses and bowel sounds normal  MS: no musculoskeletal defects are noted and gait is age appropriate without ataxia  SKIN: no suspicious lesions or rashes  NEURO: Normal strength and tone, sensory exam grossly normal, mentation intact and speech normal  PSYCH: mentation appears normal and affect normal/bright    Diagnostic " Test Results:  Labs reviewed in Epic    ASSESSMENT / PLAN:   Marlin was seen today for medicare visit.    Diagnoses and all orders for this visit:    Encounter for Medicare annual wellness exam  -     Lipid panel reflex to direct LDL Non-fasting; Future  -     DEXA HIP/PELVIS/SPINE - Future; Future  -     COLOGUARD([facility] [facility]); Future  -     Hemoglobin A1c; Future  -     CBC with platelets; Future  -     Comprehensive metabolic panel (BMP + Alb, Alk Phos, ALT, AST, Total. Bili, TP); Future  -     Lipid panel reflex to direct LDL Non-fasting  -     Hemoglobin A1c  -     CBC with platelets  -     Comprehensive metabolic panel (BMP + Alb, Alk Phos, ALT, AST, Total. Bili, TP)    Mammo: Done this year, repeat in 1 year    DEXA ordered    Pap: Done 7/11/23    Colon: Cologuard ordered    Immunizations: Due for TDaP, COVID, PCV20, shingles- declines today but will get once she is done with her vacation    Discussed healthy lifestyle and aging recommendations including regular exercise, adequate and regular sleep, 5+ fruits and veggies daily.    Environmental allergies  Comments:  Flonase, Xyzal- controls them well (recent diagnosis)    Pollen-food allergy, subsequent encounter  Comments:  Allergy to raw walnuts.    Elevated blood pressure reading without diagnosis of hypertension  Comments:  Will check BP's at home and send them to me on My[facility]. Normally at home, they are lower than they are in clinic.    Screen for colon cancer  Comments:  Cologuard ordered; declined colonoscopy.    Fibromyalgia  Comments:  Symptoms stable, longstanding.  Not on medications.    Abdominal discomfort  Comments:  Symptoms overall stable and tend to be food triggered.  Orders:  -     CBC with platelets; Future  -     Comprehensive metabolic panel (BMP + Alb, Alk Phos, ALT, AST, Total. Bili, TP); Future  -     CBC with platelets  -     Comprehensive metabolic panel (BMP + Alb, Alk Phos, ALT, AST, Total. Bili, TP)    Need for hepatitis C  "screening test  -     Hepatitis C Screen Reflex to HCV RNA Quant and Genotype; Future  -     Hepatitis C Screen Reflex to HCV RNA Quant and Genotype    Need for shingles vaccine  Need for prophylactic vaccination with combined diphtheria-tetanus-pertussis (DTP) vaccine    Encounter for osteoporosis screening in asymptomatic postmenopausal patient  -     DEXA HIP/PELVIS/SPINE - Future; Future    Other orders  -     REVIEW OF HEALTH MAINTENANCE PROTOCOL ORDERS  -     PRIMARY CARE FOLLOW-UP SCHEDULING; Future        Patient has been advised of split billing requirements and indicates understanding: Yes      COUNSELING:  Reviewed preventive health counseling, as reflected in patient instructions      BMI:   Estimated body mass index is 35.52 kg/m  as calculated from the following:    Height as of this encounter: 1.725 m (5' 7.91\").    Weight as of this encounter: 105.7 kg (233 lb).   Weight management plan: Discussed healthy diet and exercise guidelines      She reports that she has never smoked. She has been exposed to tobacco smoke. She has never used smokeless tobacco.      Appropriate preventive services were discussed with this patient, including applicable screening as appropriate for cardiovascular disease, diabetes, osteopenia/osteoporosis, and glaucoma.  As appropriate for age/gender, discussed screening for colorectal cancer, prostate cancer, breast cancer, and cervical cancer. Checklist reviewing preventive services available has been given to the patient.    Reviewed patients plan of care and provided an AVS. The Basic Care Plan (routine screening as documented in Health Maintenance) for Marlin meets the Care Plan requirement. This Care Plan has been established and reviewed with the Patient.      Florence Dwyer MD  [facility]    Identified Health Risks:  I have reviewed Opioid Use Disorder and Substance Use Disorder risk factors and made any needed referrals.     The " patient was provided with suggestions to help her develop a healthy physical lifestyle.    Information on urinary incontinence and treatment options given to patient.

## 2023-07-25 LAB — HCV AB SERPL QL IA: NONREACTIVE

## 2023-08-11 ENCOUNTER — TELEPHONE (OUTPATIENT)
Dept: FAMILY MEDICINE | Facility: CLINIC | Age: 68
End: 2023-08-11
Payer: MEDICARE

## 2023-08-11 NOTE — TELEPHONE ENCOUNTER
Dr. Dwyer --    Please review and advise: diagnosis code    Kera (Provider Support Senior) from [email or web address] (Cologuard order) called regarding Cologuard diagnosis coding.     Suggestions:   -- Z12.11. Screening for malignant neoplasm of colon.   -- Z12.12. Screening for malignant neoplasms of rectum    [facility] will need to be called back with correct diagnosis code.   Phone: [phone]   Case #: [id number]     ASMITA AranaN VALORIE  [facility]

## 2023-08-14 NOTE — TELEPHONE ENCOUNTER
-- Z12.11. Screening for malignant neoplasm of colon.     Thank you!    Florence Dwyer,   Internal Medicine - Pediatrics Physician  [facility]

## 2023-08-14 NOTE — TELEPHONE ENCOUNTER
Writer called [employer], spoke with Luz Maria, relayed message per Dr. Dwyer.    Per Luz Maria, updated diagnosis code was approved by insurance and patient should receive testing kit in 3-7 days.    ASMITA HopperN, RN-BC  [facility]

## 2023-08-30 DIAGNOSIS — R07.0 THROAT PAIN: ICD-10-CM

## 2023-08-30 DIAGNOSIS — J30.89 NON-SEASONAL ALLERGIC RHINITIS DUE TO OTHER ALLERGIC TRIGGER: ICD-10-CM

## 2023-08-30 RX ORDER — LEVOCETIRIZINE DIHYDROCHLORIDE 5 MG/1
5 TABLET, FILM COATED ORAL EVERY EVENING
Qty: 30 TABLET | Refills: 1 | Status: SHIPPED | OUTPATIENT
Start: 2023-08-30

## 2023-08-30 NOTE — TELEPHONE ENCOUNTER
"Routing refill request to provider for review/approval because:  Age warning    Last Written Prescription Date:  7/1/2023  Last Fill Quantity: 30,  # refills: 1   Last office visit provider:  7/24/2023     Requested Prescriptions   Pending Prescriptions Disp Refills    levocetirizine (XYZAL) 5 MG tablet [Pharmacy Med Name: LEVOCETIRIZINE 5 MG TABLET] 30 tablet 1     Sig: TAKE 1 TABLET BY MOUTH EVERY EVENING       Antihistamines Protocol Failed - 8/30/2023  2:48 PM        Failed - Patient is 3-64 years of age     Apply weight-based dosing for peds patients age 3 - 12 years of age.    Forward request to provider for patients under the age of 3 or over the age of 64.          Passed - Recent (12 mo) or future (30 days) visit within the authorizing provider's specialty     Patient has had an office visit with the authorizing provider or a provider within the authorizing providers department within the previous 12 mos or has a future within next 30 days. See \"Patient Info\" tab in inbasket, or \"Choose Columns\" in Meds & Orders section of the refill encounter.              Passed - Medication is active on med list             Nadya Curry RN 08/30/23 2:49 PM  "

## 2023-09-11 ENCOUNTER — MYC MEDICAL ADVICE (OUTPATIENT)
Dept: FAMILY MEDICINE | Facility: CLINIC | Age: 68
End: 2023-09-11
Payer: COMMERCIAL

## 2023-09-12 ENCOUNTER — MYC MEDICAL ADVICE (OUTPATIENT)
Dept: FAMILY MEDICINE | Facility: CLINIC | Age: 68
End: 2023-09-12
Payer: COMMERCIAL

## 2023-09-12 DIAGNOSIS — M79.644 CHRONIC PAIN OF RIGHT THUMB: Primary | ICD-10-CM

## 2023-09-12 DIAGNOSIS — G89.29 CHRONIC PAIN OF RIGHT THUMB: Primary | ICD-10-CM

## 2024-03-22 ENCOUNTER — TELEPHONE (OUTPATIENT)
Dept: FAMILY MEDICINE | Facility: CLINIC | Age: 69
End: 2024-03-22
Payer: COMMERCIAL

## 2024-03-22 DIAGNOSIS — U07.1 INFECTION DUE TO 2019 NOVEL CORONAVIRUS: Primary | ICD-10-CM

## 2024-03-22 NOTE — TELEPHONE ENCOUNTER
Pt immunizations were also reconciled and PCP changed at pt request.    RN COVID TREATMENT VISIT  03/22/24      The patient has been triaged and does not require a higher level of care.    Marlin Munoz  68 year old  Current weight? 233    Has the patient been seen by a primary care provider at an [facility] or [facility] within the past two years? Yes.   Have you been in close proximity to/do you have a known exposure to a person with a confirmed case of influenza? No.     General treatment eligibility:  Date of positive COVID test (PCR or at home)?  3/22/24    Are you or have you been hospitalized for this COVID-19 infection? No.   Have you received monoclonal antibodies or antiviral treatment for COVID-19 since this positive test? No.   Do you have any of the following conditions that place you at risk of being very sick from COVID-19?   - Age 50 years or older  - Overweight or Obesity (BMI >85th percentile or BMI 25 or higher)  Yes, patient has at least one high risk condition as noted above.     Current COVID symptoms:   - fever or chills  - cough  - muscle or body aches  - congestion or runny nose  Yes. Patient has at least one symptom as selected.     How many days since symptoms started? 5 days or less. Established patient, 12 years or older weighing at least 88.2 lbs, who has symptoms that started in the past 5 days, has not been hospitalized nor received treatment already, and is at risk for being very sick from COVID-19.     Treatment eligibility by RN:  Are you currently pregnant or nursing? No  Do you have a clinically significant hypersensitivity to nirmatrelvir or ritonavir, or toxic epidermal necrolysis (TEN) or Grove-Guillermo Syndrome? No  Do you have a history of hepatitis, any hepatic impairment on the Problem List (such as Child-Mahan Class C, cirrhosis, fatty liver disease, alcoholic liver disease), or was the last liver lab (hepatic panel, ALT, AST, ALK Phos,  bilirubin) elevated in the past 6 months? No  Do you have any history of severe renal impairment (eGFR < 30mL/min)? No    Is patient eligible to continue? Yes, patient meets all eligibility requirements for the RN COVID treatment (as denoted by all no responses above).     Current Outpatient Medications   Medication Sig Dispense Refill    fluticasone (FLONASE) 50 MCG/ACT nasal spray Spray 2 sprays into both nostrils daily 18.2 mL 1    levocetirizine (XYZAL) 5 MG tablet TAKE 1 TABLET BY MOUTH EVERY EVENING 30 tablet 1    MAGNESIUM PO       Multiple Vitamin (MULTI-VITAMIN DAILY PO)       vitamin B complex with vitamin C (VITAMIN  B COMPLEX) tablet Take 1 tablet by mouth daily      VITAMIN D PO          Medications from List 1 of the standing order (on medications that exclude the use of Paxlovid) that patient is taking: NONE. Is patient taking Bianca's Wort? No  Is patient taking Paxtang's Wort or any meds from List 1? No.   Medications from List 2 of the standing order (on meds that provider needs to adjust) that patient is taking: NONE. Is patient on any of the meds from List 2? No.   Medications from List 3 of standing order (on meds that a RN needs to adjust) that patient is taking: NONE. Is patient on any meds from List 3? No.     Paxlovid has an approximate 90% reduction in hospitalization. Paxlovid can possibly cause altered sense of taste, diarrhea (loose, watery stools), high blood pressure, muscle aches.     Would patient like a Paxlovid prescription?   Yes.   Lab Results   Component Value Date    GFRESTIMATED 84 07/24/2023       Was last eGFR reduced? No, eGFR 60 or greater/ No Result on record. Patient can receive the normal renal function dose. Paxlovid Rx sent to [facility]       Temporary change to home medications: None    All medication adjustments (holds, etc) were discussed with the patient and patient was asked to repeat back (teachback) their med adjustment.  Did patient understand med  adjustment? No medication adjustments needed.         Reviewed the following instructions with the patient:    Paxlovid (nimatrelvir and ritonavir)    How it works  Two medicines (nirmatrelvir and ritonavir) are taken together. They stop the virus from growing. Less amount of virus is easier for your body to fight.    How to take  Medicine comes in a daily container with both medicine tablets. Take by mouth twice daily (once in the morning, once at night) for 5 days.  The number of tablets to take varies by patient.  Don't chew or break capsules. Swallow whole.    When to take  Take as soon as possible after positive COVID-19 test result, and within 5 days of your first symptoms.    Possible side effects  Can cause altered sense of taste, diarrhea (loose, watery stools), high blood pressure, muscle aches.    Samreen Harper RN

## 2024-06-04 ENCOUNTER — ANCILLARY PROCEDURE (OUTPATIENT)
Dept: BONE DENSITY | Facility: CLINIC | Age: 69
End: 2024-06-04
Attending: INTERNAL MEDICINE
Payer: MEDICARE

## 2024-06-04 DIAGNOSIS — Z78.0 ENCOUNTER FOR OSTEOPOROSIS SCREENING IN ASYMPTOMATIC POSTMENOPAUSAL PATIENT: ICD-10-CM

## 2024-06-04 DIAGNOSIS — Z00.00 ENCOUNTER FOR MEDICARE ANNUAL WELLNESS EXAM: ICD-10-CM

## 2024-06-04 DIAGNOSIS — Z13.820 ENCOUNTER FOR OSTEOPOROSIS SCREENING IN ASYMPTOMATIC POSTMENOPAUSAL PATIENT: ICD-10-CM

## 2024-06-04 PROCEDURE — 77080 DXA BONE DENSITY AXIAL: CPT | Mod: TC | Performed by: RADIOLOGY

## 2024-06-28 ENCOUNTER — ANCILLARY PROCEDURE (OUTPATIENT)
Dept: MAMMOGRAPHY | Facility: HOSPITAL | Age: 69
End: 2024-06-28
Attending: INTERNAL MEDICINE
Payer: MEDICARE

## 2024-06-28 DIAGNOSIS — Z12.31 VISIT FOR SCREENING MAMMOGRAM: ICD-10-CM

## 2024-06-28 PROCEDURE — 77063 BREAST TOMOSYNTHESIS BI: CPT

## 2024-08-24 ENCOUNTER — HEALTH MAINTENANCE LETTER (OUTPATIENT)
Age: 69
End: 2024-08-24

## 2024-08-28 ENCOUNTER — ORDERS ONLY (AUTO-RELEASED) (OUTPATIENT)
Dept: FAMILY MEDICINE | Facility: CLINIC | Age: 69
End: 2024-08-28

## 2024-08-28 ENCOUNTER — OFFICE VISIT (OUTPATIENT)
Dept: FAMILY MEDICINE | Facility: CLINIC | Age: 69
End: 2024-08-28
Attending: INTERNAL MEDICINE
Payer: MEDICARE

## 2024-08-28 VITALS
HEIGHT: 68 IN | HEART RATE: 76 BPM | SYSTOLIC BLOOD PRESSURE: 165 MMHG | DIASTOLIC BLOOD PRESSURE: 94 MMHG | RESPIRATION RATE: 18 BRPM | OXYGEN SATURATION: 97 % | BODY MASS INDEX: 35.37 KG/M2 | TEMPERATURE: 96.8 F | WEIGHT: 233.4 LBS

## 2024-08-28 DIAGNOSIS — R79.9 ABNORMAL FINDING OF BLOOD CHEMISTRY, UNSPECIFIED: ICD-10-CM

## 2024-08-28 DIAGNOSIS — Z23 NEED FOR SHINGLES VACCINE: ICD-10-CM

## 2024-08-28 DIAGNOSIS — U09.9 LONG COVID: ICD-10-CM

## 2024-08-28 DIAGNOSIS — R73.03 PREDIABETES: Primary | ICD-10-CM

## 2024-08-28 DIAGNOSIS — Z12.11 SCREEN FOR COLON CANCER: ICD-10-CM

## 2024-08-28 DIAGNOSIS — Z13.88 SCREENING FOR LEAD EXPOSURE: ICD-10-CM

## 2024-08-28 DIAGNOSIS — Z00.00 ENCOUNTER FOR MEDICARE ANNUAL WELLNESS EXAM: ICD-10-CM

## 2024-08-28 DIAGNOSIS — T56.0X4A TOXIC EFFECT OF LEAD AND ITS COMPOUNDS, UNDETERMINED, INITIAL ENCOUNTER: ICD-10-CM

## 2024-08-28 DIAGNOSIS — Z23 NEED FOR TDAP VACCINATION: ICD-10-CM

## 2024-08-28 LAB
ALBUMIN SERPL BCG-MCNC: 4.2 G/DL (ref 3.5–5.2)
ALP SERPL-CCNC: 97 U/L (ref 40–150)
ALT SERPL W P-5'-P-CCNC: 18 U/L (ref 0–50)
ANION GAP SERPL CALCULATED.3IONS-SCNC: 11 MMOL/L (ref 7–15)
AST SERPL W P-5'-P-CCNC: 22 U/L (ref 0–45)
BASOPHILS # BLD AUTO: 0 10E3/UL (ref 0–0.2)
BASOPHILS NFR BLD AUTO: 0 %
BILIRUB SERPL-MCNC: 0.3 MG/DL
BUN SERPL-MCNC: 17.3 MG/DL (ref 8–23)
CALCIUM SERPL-MCNC: 9.1 MG/DL (ref 8.8–10.4)
CHLORIDE SERPL-SCNC: 105 MMOL/L (ref 98–107)
CHOLEST SERPL-MCNC: 239 MG/DL
CREAT SERPL-MCNC: 0.76 MG/DL (ref 0.51–0.95)
EGFRCR SERPLBLD CKD-EPI 2021: 84 ML/MIN/1.73M2
EOSINOPHIL # BLD AUTO: 0.2 10E3/UL (ref 0–0.7)
EOSINOPHIL NFR BLD AUTO: 2 %
ERYTHROCYTE [DISTWIDTH] IN BLOOD BY AUTOMATED COUNT: 12.7 % (ref 10–15)
FASTING STATUS PATIENT QL REPORTED: YES
FASTING STATUS PATIENT QL REPORTED: YES
FERRITIN SERPL-MCNC: 132 NG/ML (ref 11–328)
GLUCOSE SERPL-MCNC: 101 MG/DL (ref 70–99)
HBA1C MFR BLD: 6 % (ref 0–5.6)
HCO3 SERPL-SCNC: 26 MMOL/L (ref 22–29)
HCT VFR BLD AUTO: 42.9 % (ref 35–47)
HDLC SERPL-MCNC: 45 MG/DL
HGB BLD-MCNC: 13.6 G/DL (ref 11.7–15.7)
IMM GRANULOCYTES # BLD: 0 10E3/UL
IMM GRANULOCYTES NFR BLD: 0 %
IRON BINDING CAPACITY (ROCHE): 276 UG/DL (ref 240–430)
IRON SATN MFR SERPL: 24 % (ref 15–46)
IRON SERPL-MCNC: 66 UG/DL (ref 37–145)
LDLC SERPL CALC-MCNC: 139 MG/DL
LYMPHOCYTES # BLD AUTO: 1.9 10E3/UL (ref 0.8–5.3)
LYMPHOCYTES NFR BLD AUTO: 25 %
MCH RBC QN AUTO: 27.9 PG (ref 26.5–33)
MCHC RBC AUTO-ENTMCNC: 31.7 G/DL (ref 31.5–36.5)
MCV RBC AUTO: 88 FL (ref 78–100)
MONOCYTES # BLD AUTO: 0.4 10E3/UL (ref 0–1.3)
MONOCYTES NFR BLD AUTO: 6 %
NEUTROPHILS # BLD AUTO: 4.9 10E3/UL (ref 1.6–8.3)
NEUTROPHILS NFR BLD AUTO: 66 %
NONHDLC SERPL-MCNC: 194 MG/DL
PLATELET # BLD AUTO: 289 10E3/UL (ref 150–450)
POTASSIUM SERPL-SCNC: 4.3 MMOL/L (ref 3.4–5.3)
PROT SERPL-MCNC: 7.2 G/DL (ref 6.4–8.3)
RBC # BLD AUTO: 4.88 10E6/UL (ref 3.8–5.2)
SODIUM SERPL-SCNC: 142 MMOL/L (ref 135–145)
TRIGL SERPL-MCNC: 277 MG/DL
TSH SERPL DL<=0.005 MIU/L-ACNC: 3.47 UIU/ML (ref 0.3–4.2)
WBC # BLD AUTO: 7.4 10E3/UL (ref 4–11)

## 2024-08-28 PROCEDURE — 90677 PCV20 VACCINE IM: CPT | Performed by: INTERNAL MEDICINE

## 2024-08-28 PROCEDURE — 82728 ASSAY OF FERRITIN: CPT | Performed by: INTERNAL MEDICINE

## 2024-08-28 PROCEDURE — G0009 ADMIN PNEUMOCOCCAL VACCINE: HCPCS | Performed by: INTERNAL MEDICINE

## 2024-08-28 PROCEDURE — 83036 HEMOGLOBIN GLYCOSYLATED A1C: CPT | Performed by: INTERNAL MEDICINE

## 2024-08-28 PROCEDURE — 83655 ASSAY OF LEAD: CPT | Mod: 90 | Performed by: INTERNAL MEDICINE

## 2024-08-28 PROCEDURE — 80061 LIPID PANEL: CPT | Performed by: INTERNAL MEDICINE

## 2024-08-28 PROCEDURE — 83550 IRON BINDING TEST: CPT | Performed by: INTERNAL MEDICINE

## 2024-08-28 PROCEDURE — G0439 PPPS, SUBSEQ VISIT: HCPCS | Performed by: INTERNAL MEDICINE

## 2024-08-28 PROCEDURE — 99000 SPECIMEN HANDLING OFFICE-LAB: CPT | Performed by: INTERNAL MEDICINE

## 2024-08-28 PROCEDURE — 80053 COMPREHEN METABOLIC PANEL: CPT | Performed by: INTERNAL MEDICINE

## 2024-08-28 PROCEDURE — 99213 OFFICE O/P EST LOW 20 MIN: CPT | Mod: 25 | Performed by: INTERNAL MEDICINE

## 2024-08-28 PROCEDURE — 83540 ASSAY OF IRON: CPT | Performed by: INTERNAL MEDICINE

## 2024-08-28 PROCEDURE — 36415 COLL VENOUS BLD VENIPUNCTURE: CPT | Performed by: INTERNAL MEDICINE

## 2024-08-28 PROCEDURE — 84443 ASSAY THYROID STIM HORMONE: CPT | Performed by: INTERNAL MEDICINE

## 2024-08-28 PROCEDURE — 85025 COMPLETE CBC W/AUTO DIFF WBC: CPT | Performed by: INTERNAL MEDICINE

## 2024-08-28 SDOH — HEALTH STABILITY: PHYSICAL HEALTH: ON AVERAGE, HOW MANY MINUTES DO YOU ENGAGE IN EXERCISE AT THIS LEVEL?: 50 MIN

## 2024-08-28 SDOH — HEALTH STABILITY: PHYSICAL HEALTH: ON AVERAGE, HOW MANY DAYS PER WEEK DO YOU ENGAGE IN MODERATE TO STRENUOUS EXERCISE (LIKE A BRISK WALK)?: 4 DAYS

## 2024-08-28 ASSESSMENT — SOCIAL DETERMINANTS OF HEALTH (SDOH): HOW OFTEN DO YOU GET TOGETHER WITH FRIENDS OR RELATIVES?: THREE TIMES A WEEK

## 2024-08-28 NOTE — NURSING NOTE
Prior to immunization administration, verified patients identity using patient s name and date of birth. Please see Immunization Activity for additional information.     Screening Questionnaire for Adult Immunization    Are you sick today?   No   Do you have allergies to medications, food, a vaccine component or latex?   Yes   Have you ever had a serious reaction after receiving a vaccination?   No   Do you have a long-term health problem with heart, lung, kidney, or metabolic disease (e.g., diabetes), asthma, a blood disorder, no spleen, complement component deficiency, a cochlear implant, or a spinal fluid leak?  Are you on long-term aspirin therapy?   No   Do you have cancer, leukemia, HIV/AIDS, or any other immune system problem?   No   Do you have a parent, brother, or sister with an immune system problem?   No   In the past 3 months, have you taken medications that affect  your immune system, such as prednisone, other steroids, or anticancer drugs; drugs for the treatment of rheumatoid arthritis, Crohn s disease, or psoriasis; or have you had radiation treatments?   No   Have you had a seizure, or a brain or other nervous system problem?   No   During the past year, have you received a transfusion of blood or blood    products, or been given immune (gamma) globulin or antiviral drug?   No   For women: Are you pregnant or is there a chance you could become       pregnant during the next month?   No   Have you received any vaccinations in the past 4 weeks?   No     Immunization questionnaire was positive for at least one answer.  Notified marta.      Patient instructed to remain in clinic for 15 minutes afterwards, and to report any adverse reactions.     Screening performed by Hue Felix MA on 8/28/2024 at 9:34 AM.

## 2024-08-28 NOTE — PATIENT INSTRUCTIONS
I recommend getting the shingles vaccine when it is convenient for you.  It is a 2 shot series  by 2 months.  The side effects can be low grade fevers and muscle aches that resolve within about 24 hours.    I recommend getting the updated COVID vaccine and flu vaccine and TDaP this fall (separate them out).      I recommend a total of 2000 units of Vitamin D    CALCIUM Recommendation 1500 mg/day in divided dose of no more than 500 mg/dose. This includes what is in your food and also what is in any supplements you are taking.       Dietary sources of calcium: These also contain vitamin D  Milk / buttermilk              8 oz            300 mg  Dry milk powder       5 Tbsp             300 mg  Yogurt                          1 cup           400 mg  Ice cream                    1/2 cup          100 mg  Hard cheese                     1.5 oz          300 mg  Cottage cheese                1/2 cup        65 mg  Spinach, cooked          1 cup              240 mg  Tofu, soft regular           4 oz          120 to 390 mg  Sardines                       3 oz               370 mg  Mackerel canned         3 oz                250 mg  Canned salmon with bones 3 oz        170-210 mg  Calcium fortified cereals are available  SILK soy and almond milk products are calcium fortified  Orange juice  Fortified with Calcium       8 oz            300 mg  Low fat dairy sources are recommended     Recommended exercise goals:  30 minutes of moderate exercise on most days of the week .  Weight bearing exercise (ie, walking, jogging, hiking, dancing)     Strength training 2 or more times/week in addition to other weight -being exercise.  Strength training -- uses weight or resistance beyond that seen in everyday activities -(pilates, weight training with free weights, weight machines or resistance bands)     OSTEOPOROSIS of the spine: avoid exercise machines that incorporate spinal flexion, trunk rotation, or forward bending    Specifically avoid abdominal exercisers, stationary bikes with moving handles, cross-country ski machines, rowing machines, and bicep curl machines    Patient Education   Preventive Care Advice   This is general advice given by our system to help you stay healthy. However, your care team may have specific advice just for you. Please talk to your care team about your preventive care needs.  Nutrition  Eat 5 or more servings of fruits and vegetables each day.  Try wheat bread, brown rice and whole grain pasta (instead of white bread, rice, and pasta).  Get enough calcium and vitamin D. Check the label on foods and aim for 100% of the RDA (recommended daily allowance).  Lifestyle  Exercise at least 150 minutes each week  (30 minutes a day, 5 days a week).  Do muscle strengthening activities 2 days a week. These help control your weight and prevent disease.  No smoking.  Wear sunscreen to prevent skin cancer.  Have a dental exam and cleaning every 6 months.  Yearly exams  See your health care team every year to talk about:  Any changes in your health.  Any medicines your care team has prescribed.  Preventive care, family planning, and ways to prevent chronic diseases.  Shots (vaccines)   HPV shots (up to age 26), if you've never had them before.  Hepatitis B shots (up to age 59), if you've never had them before.  COVID-19 shot: Get this shot when it's due.  Flu shot: Get a flu shot every year.  Tetanus shot: Get a tetanus shot every 10 years.  Pneumococcal, hepatitis A, and RSV shots: Ask your care team if you need these based on your risk.  Shingles shot (for age 50 and up)  General health tests  Diabetes screening:  Starting at age 35, Get screened for diabetes at least every 3 years.  If you are younger than age 35, ask your care team if you should be screened for diabetes.  Cholesterol test: At age 39, start having a cholesterol test every 5 years, or more often if advised.  Bone density scan (DEXA): At age 50,  ask your care team if you should have this scan for osteoporosis (brittle bones).  Hepatitis C: Get tested at least once in your life.  STIs (sexually transmitted infections)  Before age 24: Ask your care team if you should be screened for STIs.  After age 24: Get screened for STIs if you're at risk. You are at risk for STIs (including HIV) if:  You are sexually active with more than one person.  You don't use condoms every time.  You or a partner was diagnosed with a sexually transmitted infection.  If you are at risk for HIV, ask about PrEP medicine to prevent HIV.  Get tested for HIV at least once in your life, whether you are at risk for HIV or not.  Cancer screening tests  Cervical cancer screening: If you have a cervix, begin getting regular cervical cancer screening tests starting at age 21.  Breast cancer scan (mammogram): If you've ever had breasts, begin having regular mammograms starting at age 40. This is a scan to check for breast cancer.  Colon cancer screening: It is important to start screening for colon cancer at age 45.  Have a colonoscopy test every 10 years (or more often if you're at risk) Or, ask your provider about stool tests like a FIT test every year or Cologuard test every 3 years.  To learn more about your testing options, visit:   .  For help making a decision, visit:   https://bit.ly/oq10323.  Prostate cancer screening test: If you have a prostate, ask your care team if a prostate cancer screening test (PSA) at age 55 is right for you.  Lung cancer screening: If you are a current or former smoker ages 50 to 80, ask your care team if ongoing lung cancer screenings are right for you.  For informational purposes only. Not to replace the advice of your health care provider. Copyright   2023 [employer]. All rights reserved. Clinically reviewed by the [facility]. Funding Gates 935803 - REV 01/24.  Learning About Sleeping Well  What does sleeping well  mean?     Sleeping well means getting enough sleep to feel good and stay healthy. How much sleep is enough varies among people.  The number of hours you sleep and how you feel when you wake up are both important. If you do not feel refreshed, you probably need more sleep. Another sign of not getting enough sleep is feeling tired during the day.  Experts recommend that adults get at least 7 or more hours of sleep per day. Children and older adults need more sleep.  Why is getting enough sleep important?  Getting enough quality sleep is a basic part of good health. When your sleep suffers, your physical health, mood, and your thoughts can suffer too. You may find yourself feeling more grumpy or stressed. Not getting enough sleep also can lead to serious problems, including injury, accidents, anxiety, and depression.  What might cause poor sleeping?  Many things can cause sleep problems, including:  Changes to your sleep schedule.  Stress. Stress can be caused by fear about a single event, such as giving a speech. Or you may have ongoing stress, such as worry about work or school.  Depression, anxiety, and other mental or emotional conditions.  Changes in your sleep habits or surroundings. This includes changes that happen where you sleep, such as noise, light, or sleeping in a different bed. It also includes changes in your sleep pattern, such as having jet lag or working a late shift.  Health problems, such as pain, breathing problems, and restless legs syndrome.  Lack of regular exercise.  Using alcohol, nicotine, or caffeine before bed.  How can you help yourself?  Here are some tips that may help you sleep more soundly and wake up feeling more refreshed.  Your sleeping area   Use your bedroom only for sleeping and sex. A bit of light reading may help you fall asleep. But if it doesn't, do your reading elsewhere in the house. Try not to use your TV, computer, smartphone, or tablet while you are in bed.  Be sure  "your bed is big enough to stretch out comfortably, especially if you have a sleep partner.  Keep your bedroom quiet, dark, and cool. Use curtains, blinds, or a sleep mask to block out light. To block out noise, use earplugs, soothing music, or a \"white noise\" machine.  Your evening and bedtime routine   Create a relaxing bedtime routine. You might want to take a warm shower or bath, or listen to soothing music.  Go to bed at the same time every night. And get up at the same time every morning, even if you feel tired.  What to avoid   Limit caffeine (coffee, tea, caffeinated sodas) during the day, and don't have any for at least 6 hours before bedtime.  Avoid drinking alcohol before bedtime. Alcohol can cause you to wake up more often during the night.  Try not to smoke or use tobacco, especially in the evening. Nicotine can keep you awake.  Limit naps during the day, especially close to bedtime.  Avoid lying in bed awake for too long. If you can't fall asleep or if you wake up in the middle of the night and can't get back to sleep within about 20 minutes, get out of bed and go to another room until you feel sleepy.  Avoid taking medicine right before bed that may keep you awake or make you feel hyper or energized. Your doctor can tell you if your medicine may do this and if you can take it earlier in the day.  If you can't sleep   Imagine yourself in a peaceful, pleasant scene. Focus on the details and feelings of being in a place that is relaxing.  Get up and do a quiet or boring activity until you feel sleepy.  Avoid drinking any liquids before going to bed to help prevent waking up often to use the bathroom.  Where can you learn more?  Go to https://www.[employer]SafeLogic.net/patiented  Enter J942 in the search box to learn more about \"Learning About Sleeping Well.\"  Current as of: July 10, 2023  Content Version: 14.1 2006-2024 [employer].   Care instructions adapted under license by your healthcare " professional. If you have questions about a medical condition or this instruction, always ask your healthcare professional. Healthwise, [facility] disclaims any warranty or liability for your use of this information.    Bladder Training: Care Instructions  Your Care Instructions     Bladder training is used to treat urge incontinence and stress incontinence. Urge incontinence means that the need to urinate comes on so fast that you can't get to a toilet in time. Stress incontinence means that you leak urine because of pressure on your bladder. For example, it may happen when you laugh, cough, or lift something heavy.  Bladder training can increase how long you can wait before you have to urinate. It can also help your bladder hold more urine. And it can give you better control over the urge to urinate.  It is important to remember that bladder training takes a few weeks to a few months to make a difference. You may not see results right away, but don't give up.  Follow-up care is a key part of your treatment and safety. Be sure to make and go to all appointments, and call your doctor if you are having problems. It's also a good idea to know your test results and keep a list of the medicines you take.  How can you care for yourself at home?  Work with your doctor to come up with a bladder training program that is right for you. You may use one or more of the following methods.  Delayed urination  In the beginning, try to keep from urinating for 5 minutes after you first feel the need to go.  While you wait, take deep, slow breaths to relax. Kegel exercises can also help you delay the need to go to the bathroom.  After some practice, when you can easily wait 5 minutes to urinate, try to wait 10 minutes before you urinate.  Slowly increase the waiting period until you are able to control when you have to urinate.  Scheduled urination  Empty your bladder when you first wake up in the morning.  Schedule times  "throughout the day when you will urinate.  Start by going to the bathroom every hour, even if you don't need to go.  Slowly increase the time between trips to the bathroom.  When you have found a schedule that works well for you, keep doing it.  If you wake up during the night and have to urinate, do it. Apply your schedule to waking hours only.  Kegel exercises  These tighten and strengthen pelvic muscles, which can help you control the flow of urine. (If doing these exercises causes pain, stop doing them and talk with your doctor.) To do Kegel exercises:  Squeeze your muscles as if you were trying not to pass gas. Or squeeze your muscles as if you were stopping the flow of urine. Your belly, legs, and buttocks shouldn't move.  Hold the squeeze for 3 seconds, then relax for 5 to 10 seconds.  Start with 3 seconds, then add 1 second each week until you are able to squeeze for 10 seconds.  Repeat the exercise 10 times a session. Do 3 to 8 sessions a day.  When should you call for help?  Watch closely for changes in your health, and be sure to contact your doctor if:    Your incontinence is getting worse.     You do not get better as expected.   Where can you learn more?  Go to https://www.[employer].net/patiented  Enter V684 in the search box to learn more about \"Bladder Training: Care Instructions.\"  Current as of: November 15, 2023               Content Version: 14.0    4272-7902 [employer].   Care instructions adapted under license by your healthcare professional. If you have questions about a medical condition or this instruction, always ask your healthcare professional. [employer] disclaims any warranty or liability for your use of this information.         "

## 2024-08-28 NOTE — PROGRESS NOTES
"Preventive Care Visit  [facility]  Florence Dwyer DO, Internal Medicine  Aug 28, 2024      Assessment & Plan     (Z00.00) Encounter for Medicare annual wellness exam  Comment:   Plan: Hemoglobin A1c, Lipid panel reflex to direct         LDL Non-fasting, Comprehensive metabolic panel         (BMP + Alb, Alk Phos, ALT, AST, Total. Bili,         TP)  Mammo: 6/28/24  Pap: 7/11/23; done screening  Colon (45-75): Colonoscopy 2007/2008- Look for results; Cologuard?  DEXA: 6/4/24- osteopenia; Major osteoporotic fracture: 9.7%; Hip fracture 1.5%; repeat in 2-3 years  Immunizations: PCV- recommend Shingles, TDaP, COVID  Labs: Lipids, prediabetes  Discussed healthy lifestyle and aging recommendations including regular exercise, adequate and regular sleep, 5+ fruits and veggies daily.- walking several days per week, working with  Dara Pedro as well    Prediabetes  -discussed exercise and healthy eating    (Z12.11) Screen for colon cancer  Comment:   Plan: COLOGUARD([employer])    (Z13.88) Screening for lead exposure  (T56.0X4A) Toxic effect of lead and its compounds, undetermined, initial encounter  (R79.9) Abnormal finding of blood chemistry, unspecified  Comment: Told by city has lead in her pipes- curious about her own lead levels as well.  Plan: Lead Venous Blood Confirm    (U09.9) Long COVID  Comment: Persisting symptoms of fatigue after exertion- not having chest pain/pressure with exertion- check labs and lead levels as above.  Plan: CBC with platelets and differential, Ferritin,         Iron and iron binding capacity, TSH with free         T4 reflex    Patient has been advised of split billing requirements and indicates understanding: Yes        BMI  Estimated body mass index is 35.21 kg/m  as calculated from the following:    Height as of this encounter: 1.734 m (5' 8.27\").    Weight as of this encounter: 105.9 kg (233 lb 6.4 oz).   Weight management plan: " Discussed healthy diet and exercise guidelines    Counseling  Appropriate preventive services were addressed with this patient via screening, questionnaire, or discussion as appropriate for fall prevention, nutrition, physical activity, Tobacco-use cessation, social engagement, weight loss and cognition.  Checklist reviewing preventive services available has been given to the patient.  Reviewed patient's diet, addressing concerns and/or questions.   She is at risk for psychosocial distress and has been provided with information to reduce risk.   Discussed possible causes of fatigue. Information on urinary incontinence and treatment options given to patient.       Patient Instructions   I recommend getting the shingles vaccine when it is convenient for you.  It is a 2 shot series  by 2 months.  The side effects can be low grade fevers and muscle aches that resolve within about 24 hours.    I recommend getting the updated COVID vaccine and flu vaccine and TDaP this fall (separate them out).      I recommend a total of 2000 units of Vitamin D    CALCIUM Recommendation 1500 mg/day in divided dose of no more than 500 mg/dose. This includes what is in your food and also what is in any supplements you are taking.       Dietary sources of calcium: These also contain vitamin D  Milk / buttermilk              8 oz            300 mg  Dry milk powder       5 Tbsp             300 mg  Yogurt                          1 cup           400 mg  Ice cream                    1/2 cup          100 mg  Hard cheese                     1.5 oz          300 mg  Cottage cheese                1/2 cup        65 mg  Spinach, cooked          1 cup              240 mg  Tofu, soft regular           4 oz          120 to 390 mg  Sardines                       3 oz               370 mg  Mackerel canned         3 oz                250 mg  Canned salmon with bones 3 oz        170-210 mg  Calcium fortified cereals are available  SILK soy and  almond milk products are calcium fortified  Orange juice  Fortified with Calcium       8 oz            300 mg  Low fat dairy sources are recommended     Recommended exercise goals:  30 minutes of moderate exercise on most days of the week .  Weight bearing exercise (ie, walking, jogging, hiking, dancing)     Strength training 2 or more times/week in addition to other weight -being exercise.  Strength training -- uses weight or resistance beyond that seen in everyday activities -(pilates, weight training with free weights, weight machines or resistance bands)     OSTEOPOROSIS of the spine: avoid exercise machines that incorporate spinal flexion, trunk rotation, or forward bending   Specifically avoid abdominal exercisers, stationary bikes with moving handles, cross-country ski machines, rowing machines, and bicep curl machines    Patient Education  Preventive Care Advice   This is general advice given by our system to help you stay healthy. However, your care team may have specific advice just for you. Please talk to your care team about your preventive care needs.  Nutrition  Eat 5 or more servings of fruits and vegetables each day.  Try wheat bread, brown rice and whole grain pasta (instead of white bread, rice, and pasta).  Get enough calcium and vitamin D. Check the label on foods and aim for 100% of the RDA (recommended daily allowance).  Lifestyle  Exercise at least 150 minutes each week  (30 minutes a day, 5 days a week).  Do muscle strengthening activities 2 days a week. These help control your weight and prevent disease.  No smoking.  Wear sunscreen to prevent skin cancer.  Have a dental exam and cleaning every 6 months.  Yearly exams  See your health care team every year to talk about:  Any changes in your health.  Any medicines your care team has prescribed.  Preventive care, family planning, and ways to prevent chronic diseases.  Shots (vaccines)   HPV shots (up to age 26), if you've never had them  before.  Hepatitis B shots (up to age 59), if you've never had them before.  COVID-19 shot: Get this shot when it's due.  Flu shot: Get a flu shot every year.  Tetanus shot: Get a tetanus shot every 10 years.  Pneumococcal, hepatitis A, and RSV shots: Ask your care team if you need these based on your risk.  Shingles shot (for age 50 and up)  General health tests  Diabetes screening:  Starting at age 35, Get screened for diabetes at least every 3 years.  If you are younger than age 35, ask your care team if you should be screened for diabetes.  Cholesterol test: At age 39, start having a cholesterol test every 5 years, or more often if advised.  Bone density scan (DEXA): At age 50, ask your care team if you should have this scan for osteoporosis (brittle bones).  Hepatitis C: Get tested at least once in your life.  STIs (sexually transmitted infections)  Before age 24: Ask your care team if you should be screened for STIs.  After age 24: Get screened for STIs if you're at risk. You are at risk for STIs (including HIV) if:  You are sexually active with more than one person.  You don't use condoms every time.  You or a partner was diagnosed with a sexually transmitted infection.  If you are at risk for HIV, ask about PrEP medicine to prevent HIV.  Get tested for HIV at least once in your life, whether you are at risk for HIV or not.  Cancer screening tests  Cervical cancer screening: If you have a cervix, begin getting regular cervical cancer screening tests starting at age 21.  Breast cancer scan (mammogram): If you've ever had breasts, begin having regular mammograms starting at age 40. This is a scan to check for breast cancer.  Colon cancer screening: It is important to start screening for colon cancer at age 45.  Have a colonoscopy test every 10 years (or more often if you're at risk) Or, ask your provider about stool tests like a FIT test every year or Cologuard test every 3 years.  To learn more about your  testing options, visit:   .  For help making a decision, visit:   https://bit.ly/zu28110.  Prostate cancer screening test: If you have a prostate, ask your care team if a prostate cancer screening test (PSA) at age 55 is right for you.  Lung cancer screening: If you are a current or former smoker ages 50 to 80, ask your care team if ongoing lung cancer screenings are right for you.  For informational purposes only. Not to replace the advice of your health care provider. Copyright   2023 [employer]. All rights reserved. Clinically reviewed by the  [employer]. [employer] 452123 - REV 01/24.  Learning About Sleeping Well  What does sleeping well mean?     Sleeping well means getting enough sleep to feel good and stay healthy. How much sleep is enough varies among people.  The number of hours you sleep and how you feel when you wake up are both important. If you do not feel refreshed, you probably need more sleep. Another sign of not getting enough sleep is feeling tired during the day.  Experts recommend that adults get at least 7 or more hours of sleep per day. Children and older adults need more sleep.  Why is getting enough sleep important?  Getting enough quality sleep is a basic part of good health. When your sleep suffers, your physical health, mood, and your thoughts can suffer too. You may find yourself feeling more grumpy or stressed. Not getting enough sleep also can lead to serious problems, including injury, accidents, anxiety, and depression.  What might cause poor sleeping?  Many things can cause sleep problems, including:  Changes to your sleep schedule.  Stress. Stress can be caused by fear about a single event, such as giving a speech. Or you may have ongoing stress, such as worry about work or school.  Depression, anxiety, and other mental or emotional conditions.  Changes in your sleep habits or surroundings. This includes changes that happen where you sleep,  "such as noise, light, or sleeping in a different bed. It also includes changes in your sleep pattern, such as having jet lag or working a late shift.  Health problems, such as pain, breathing problems, and restless legs syndrome.  Lack of regular exercise.  Using alcohol, nicotine, or caffeine before bed.  How can you help yourself?  Here are some tips that may help you sleep more soundly and wake up feeling more refreshed.  Your sleeping area   Use your bedroom only for sleeping and sex. A bit of light reading may help you fall asleep. But if it doesn't, do your reading elsewhere in the house. Try not to use your TV, computer, smartphone, or tablet while you are in bed.  Be sure your bed is big enough to stretch out comfortably, especially if you have a sleep partner.  Keep your bedroom quiet, dark, and cool. Use curtains, blinds, or a sleep mask to block out light. To block out noise, use earplugs, soothing music, or a \"white noise\" machine.  Your evening and bedtime routine   Create a relaxing bedtime routine. You might want to take a warm shower or bath, or listen to soothing music.  Go to bed at the same time every night. And get up at the same time every morning, even if you feel tired.  What to avoid   Limit caffeine (coffee, tea, caffeinated sodas) during the day, and don't have any for at least 6 hours before bedtime.  Avoid drinking alcohol before bedtime. Alcohol can cause you to wake up more often during the night.  Try not to smoke or use tobacco, especially in the evening. Nicotine can keep you awake.  Limit naps during the day, especially close to bedtime.  Avoid lying in bed awake for too long. If you can't fall asleep or if you wake up in the middle of the night and can't get back to sleep within about 20 minutes, get out of bed and go to another room until you feel sleepy.  Avoid taking medicine right before bed that may keep you awake or make you feel hyper or energized. Your doctor can tell you " "if your medicine may do this and if you can take it earlier in the day.  If you can't sleep   Imagine yourself in a peaceful, pleasant scene. Focus on the details and feelings of being in a place that is relaxing.  Get up and do a quiet or boring activity until you feel sleepy.  Avoid drinking any liquids before going to bed to help prevent waking up often to use the bathroom.  Where can you learn more?  Go to https://www.[employer].net/patiented  Enter J942 in the search box to learn more about \"Learning About Sleeping Well.\"  Current as of: July 10, 2023  Content Version: 14.1 2006-2024 [employer].   Care instructions adapted under license by your healthcare professional. If you have questions about a medical condition or this instruction, always ask your healthcare professional. [employer] disclaims any warranty or liability for your use of this information.    Bladder Training: Care Instructions  Your Care Instructions     Bladder training is used to treat urge incontinence and stress incontinence. Urge incontinence means that the need to urinate comes on so fast that you can't get to a toilet in time. Stress incontinence means that you leak urine because of pressure on your bladder. For example, it may happen when you laugh, cough, or lift something heavy.  Bladder training can increase how long you can wait before you have to urinate. It can also help your bladder hold more urine. And it can give you better control over the urge to urinate.  It is important to remember that bladder training takes a few weeks to a few months to make a difference. You may not see results right away, but don't give up.  Follow-up care is a key part of your treatment and safety. Be sure to make and go to all appointments, and call your doctor if you are having problems. It's also a good idea to know your test results and keep a list of the medicines you take.  How can you care for yourself at " home?  Work with your doctor to come up with a bladder training program that is right for you. You may use one or more of the following methods.  Delayed urination  In the beginning, try to keep from urinating for 5 minutes after you first feel the need to go.  While you wait, take deep, slow breaths to relax. Kegel exercises can also help you delay the need to go to the bathroom.  After some practice, when you can easily wait 5 minutes to urinate, try to wait 10 minutes before you urinate.  Slowly increase the waiting period until you are able to control when you have to urinate.  Scheduled urination  Empty your bladder when you first wake up in the morning.  Schedule times throughout the day when you will urinate.  Start by going to the bathroom every hour, even if you don't need to go.  Slowly increase the time between trips to the bathroom.  When you have found a schedule that works well for you, keep doing it.  If you wake up during the night and have to urinate, do it. Apply your schedule to waking hours only.  Kegel exercises  These tighten and strengthen pelvic muscles, which can help you control the flow of urine. (If doing these exercises causes pain, stop doing them and talk with your doctor.) To do Kegel exercises:  Squeeze your muscles as if you were trying not to pass gas. Or squeeze your muscles as if you were stopping the flow of urine. Your belly, legs, and buttocks shouldn't move.  Hold the squeeze for 3 seconds, then relax for 5 to 10 seconds.  Start with 3 seconds, then add 1 second each week until you are able to squeeze for 10 seconds.  Repeat the exercise 10 times a session. Do 3 to 8 sessions a day.  When should you call for help?  Watch closely for changes in your health, and be sure to contact your doctor if:    Your incontinence is getting worse.     You do not get better as expected.   Where can you learn more?  Go to https://www.healthwise.net/patiented  Enter V684 in the search box to  "learn more about \"Bladder Training: Care Instructions.\"  Current as of: November 15, 2023               Content Version: 14.0    [id number] [employer].   Care instructions adapted under license by your healthcare professional. If you have questions about a medical condition or this instruction, always ask your healthcare professional. [employer] disclaims any warranty or liability for your use of this information.           Kevin Ridley is a 69 year old, presenting for the following:  Physical        8/28/2024     8:31 AM   Additional Questions   Roomed by elina   Accompanied by self        Health Care Directive  Patient does not have a Health Care Directive or Living Will: Patient states has Advance Directive and will bring in a copy to clinic.    HPI    Daughter had latent TB.  Had it as a young person- 18.  Not symptomatic.    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  Was told she has lead in the water.  Did have the water tested and it was in the intermediate zone- has been filtering the water.  Interested in having lead level checked.    Had COVID in March 2024.  Wondering if has long COVID.  Went from feeling energized some days with some days needing nap- to postCOVID had 6 weeks of brain fog and exhaustion- now feels like has days where she will start out energized and then later in the day will feel like she is completely wiped out- needs to take a nap immediately- feels like hitting a wall.    Occasional nightsweats- no difference.  No weight loss.  Occ cough- not a change from before.            8/28/2024   General Health   How would you rate your overall physical health? (!) FAIR   Feel stress (tense, anxious, or unable to sleep) Only a little      (!) STRESS CONCERN      8/28/2024   Nutrition   Diet: Low fat/cholesterol            8/28/2024   Exercise   Days per week of moderate/strenous exercise 4 days   Average minutes spent exercising at this level 50 min    "         8/28/2024   Social Factors   Frequency of gathering with friends or relatives Three times a week   Worry food won't last until get money to buy more No   Food not last or not have enough money for food? No   Do you have housing? (Housing is defined as stable permanent housing and does not include staying ouside in a car, in a tent, in an abandoned building, in an overnight shelter, or couch-surfing.) Yes   Are you worried about losing your housing? No   Lack of transportation? No   Unable to get utilities (heat,electricity)? No            8/28/2024   Fall Risk   Fallen 2 or more times in the past year? No   Trouble with walking or balance? No             8/28/2024   Activities of Daily Living- Home Safety   Needs help with the following daily activites None of the above   Safety concerns in the home None of the above            8/28/2024   Dental   Dentist two times every year? Yes            8/28/2024   Hearing Screening   Hearing concerns? None of the above            8/28/2024   Driving Risk Screening   Patient/family members have concerns about driving No            8/28/2024   General Alertness/Fatigue Screening   Have you been more tired than usual lately? (!) YES            8/28/2024   Urinary Incontinence Screening   Bothered by leaking urine in past 6 months Yes            8/28/2024   TB Screening   Were you born outside of the [place]? No            Today's PHQ-2 Score:       8/28/2024     8:26 AM   PHQ-2 ( 1999 Pfizer)   Q1: Little interest or pleasure in doing things 0   Q2: Feeling down, depressed or hopeless 0   PHQ-2 Score 0   Q1: Little interest or pleasure in doing things Not at all   Q2: Feeling down, depressed or hopeless Not at all   PHQ-2 Score 0           8/28/2024   Substance Use   Alcohol more than 3/day or more than 7/wk Not Applicable   Do you have a current opioid prescription? No   How severe/bad is pain from 1 to 10? 2/10   Do you use any other substances recreationally? No         Social History     Tobacco Use    Smoking status: Never     Passive exposure: Past (dad smoked until 1963)    Smokeless tobacco: Never   Vaping Use    Vaping status: Never Used   Substance Use Topics    Alcohol use: Not Currently     Comment: 0-1 drinks/week    Drug use: No           6/28/2024   LAST FHS-7 RESULTS   1st degree relative breast or ovarian cancer Yes   Any relative bilateral breast cancer No   Any male have breast cancer No   Any ONE woman have BOTH breast AND ovarian cancer No   Any woman with breast cancer before 50yrs No   2 or more relatives with breast AND/OR ovarian cancer Yes   2 or more relatives with breast AND/OR bowel cancer Yes           Mammogram Screening - Mammogram every 1-2 years updated in Health Maintenance based on mutual decision making      History of abnormal Pap smear: No - age 65 or older with adequate negative prior screening test results (3 consecutive negative cytology results, 2 consecutive negative cotesting results, or 2 consecutive negative HrHPV test results within 10 years, with the most recent test occurring within the recommended screening interval for the test used)        Latest Ref Rng & Units 7/11/2023     2:14 PM 3/12/2015     2:24 PM   PAP / HPV   PAP  Negative for Intraepithelial Lesion or Malignancy (NILM)  Negative for squamous intraepithelial lesion or malignancy  Electronically signed by Jayne Vallecillo CT (ASCP) on 3/16/2015 at  4:11 PM      HPV 16 DNA Negative Negative     HPV 18 DNA Negative Negative     Other HR HPV Negative Negative       ASCVD Risk   The 10-year ASCVD risk score (Kadie BORJAS, et al., 2019) is: 16.9%    Values used to calculate the score:      Age: 69 years      Sex: Female      Is Non- : No      Diabetic: No      Tobacco smoker: No      Systolic Blood Pressure: 165 mmHg      Is BP treated: No      HDL Cholesterol: 43 mg/dL      Total Cholesterol: 255 mg/dL    Fracture Risk Assessment Tool  Link to  Frax Calculator  Use the information below to complete the Frax calculator  : 1955  Sex: female  Weight (kg): 105.9 kg (actual weight)  Height (cm): 173.4 cm  Previous Fragility Fracture:  No    Already had DEXA             Reviewed and updated as needed this visit by Provider                    Past Medical History:   Diagnosis Date    Arthritis     osteoarthritis    Fatigue     Fibromyalgia     Hemangioma     capillary    Lipoma     Seborrheic keratoses     Sinusitis     Uncomplicated asthma     this was mentioned by a nurse once, but I haven't had a formal diagnosis     Past Surgical History:   Procedure Laterality Date    ABDOMEN SURGERY  84 and 2001    C section and hysterectomy    BIOPSY BREAST Left      SECTION      COLONOSCOPY  est. ?    GYN SURGERY      see abdominal note above    HYSTERECTOMY      Cervix sparing    OOPHORECTOMY      OTHER SURGICAL HISTORY      lipoma removed    SOFT TISSUE SURGERY      lipoma removal     Lab work is in process  Current providers sharing in care for this patient include:  Patient Care Team:  Florence Dwyer DO as PCP - General (Internal Medicine)  Mary Irizarry GC as Genetic Counselor (Genetic Counselor, MS)  Florence Dwyer DO as Assigned PCP  Charisma Bowie CNM as Assigned OBGYN Provider    The following health maintenance items are reviewed in Epic and correct as of today:  Health Maintenance   Topic Date Due    COLORECTAL CANCER SCREENING  Never done    ZOSTER IMMUNIZATION (1 of 2) Never done    DTAP/TDAP/TD IMMUNIZATION (2 - Td or Tdap) 2019    COVID-19 Vaccine ( season) 2024    INFLUENZA VACCINE (1) 2024    MEDICARE ANNUAL WELLNESS VISIT  2025    ANNUAL REVIEW OF HM ORDERS  2025    FALL RISK ASSESSMENT  2025    MAMMO SCREENING  2026    GLUCOSE  2026    PAP FOLLOW-UP  2028    HPV FOLLOW-UP  2028    LIPID  2028    ADVANCE CARE  "PLANNING  08/28/2029    DEXA  06/04/2039    HEPATITIS C SCREENING  Completed    PHQ-2 (once per calendar year)  Completed    Pneumococcal Vaccine: 65+ Years  Completed    RSV VACCINE (Pregnancy & 60+)  Completed    HPV IMMUNIZATION  Aged Out    MENINGITIS IMMUNIZATION  Aged Out    RSV MONOCLONAL ANTIBODY  Aged Out            Objective    Exam  BP (!) 165/94 (BP Location: Right arm, Patient Position: Sitting, Cuff Size: Adult Large)   Pulse 76   Temp 96.8  F (36  C) (Temporal)   Resp 18   Ht 1.734 m (5' 8.27\")   Wt 105.9 kg (233 lb 6.4 oz)   SpO2 97%   BMI 35.21 kg/m     Estimated body mass index is 35.21 kg/m  as calculated from the following:    Height as of this encounter: 1.734 m (5' 8.27\").    Weight as of this encounter: 105.9 kg (233 lb 6.4 oz).    Physical Exam  GENERAL: alert and no distress  EYES: Eyes grossly normal to inspection, PERRL and conjunctivae and sclerae normal  HENT: ear canals and TM's normal, nose and mouth without ulcers or lesions  NECK: no adenopathy, no asymmetry, masses, or scars  RESP: lungs clear to auscultation - no rales, rhonchi or wheezes  CV: regular rate and rhythm, normal S1 S2, no S3 or S4, no murmur, click or rub, no peripheral edema  ABDOMEN: soft, nontender, no hepatosplenomegaly, no masses and bowel sounds normal  MS: no gross musculoskeletal defects noted, no edema  SKIN: no suspicious lesions or rashes  NEURO: Normal strength and tone, mentation intact and speech normal  PSYCH: mentation appears normal, affect normal/bright        8/28/2024   Mini Cog   Clock Draw Score 2 Normal   3 Item Recall 3 objects recalled   Mini Cog Total Score 5            Vision Screen  Vision Screen Results: Pass      Signed Electronically by: Florence Dwyer DO    "

## 2024-08-30 LAB — LEAD BLDV-MCNC: <2 UG/DL

## 2025-03-18 LAB — NONINV COLON CA DNA+OCC BLD SCRN STL QL: NEGATIVE

## 2025-07-08 ENCOUNTER — OFFICE VISIT (OUTPATIENT)
Dept: PODIATRY | Facility: CLINIC | Age: 70
End: 2025-07-08
Payer: MEDICARE

## 2025-07-08 VITALS — BODY MASS INDEX: 35.15 KG/M2 | WEIGHT: 233 LBS

## 2025-07-08 DIAGNOSIS — L60.0 INGROWN NAIL OF GREAT TOE OF RIGHT FOOT: ICD-10-CM

## 2025-07-08 DIAGNOSIS — M79.671 RIGHT FOOT PAIN: Primary | ICD-10-CM

## 2025-07-08 PROCEDURE — 11730 AVULSION NAIL PLATE SIMPLE 1: CPT | Mod: T5 | Performed by: PODIATRIST

## 2025-07-08 NOTE — PATIENT INSTRUCTIONS
Thank you for choosing [facility]!    DR GREGG[facility]S CLINIC:  [facility]   [address]   [place] [address]  [phone]    (Tues, Wed, Thur am)     [facility]  [address] [facility] [place] [address]  [phone]  (Every Friday)     TRIAGE LINE: [phone]  APPOINTMENTS: [phone]  RADIOLOGY: [phone]  SET UP SURGERY: [phone]  PHYSICAL THERAPY: [phone]   FAX NUMBER: [phone]  BILLING QUESTIONS: [phone]       Follow up: As needed      INGROWN TOENAILS  When a toenail is ingrown, it is curved and grows into the skin, usually at the nail borders (the sides of the nail). This  digging in  of the nail irritates the skin, often creating pain, redness, swelling, and warmth in the toe.  If an ingrown nail causes a break in the skin, bacteria may enter and cause an infection in the area, which is often marked by drainage and a foul odor. However, even if the toe isn t painful, red, swollen, or warm, a nail that curves downward into the skin can progress to an infection.  CAUSES:  Heredity: In many people, the tendency for ingrown toenails is inherited.   Trauma: Sometimes an ingrown toenail is the result of trauma, such as stubbing your toe, having an object fall on your toe, or engaging in activities that involve repeated pressure on the toes, such as kicking or running.   Improper Trimming:  The most common cause of ingrown toenails is cutting your nails too short. This encourages the skin next to the nail to fold over the nail.   Improperly Sized Footwear: Ingrown toenails can result from wearing socks and shoes that are tight or short.   Nail Conditions: Ingrown toenails can be caused by nail problems, such as fungal infections or losing a nail due to trauma.   TREATMENT: Sometimes initial treatment for ingrown toenails can be safely performed at home. However, home treatment is strongly discouraged if an  infection is suspected, or for those who have medical conditions that put feet at high risk, such as diabetes, nerve damage in the foot, or poor circulation.  Home care: If you don t have an infection or any of the above medical conditions, you can soak your foot in room-temperature water (adding Epsom s salt may be recommended by your doctor), and gently massage the side of the nail fold to help reduce the inflammation.  Avoid attempting  bathroom surgery.  Repeated cutting of the nail can cause the condition to worsen over time. If your symptoms fail to improve, it s time to see a foot and ankle surgeon.  Physician care: After examining the toe, the foot and ankle surgeon will select the treatment best suited for you. If an infection is present, an oral antibiotic may be prescribed.  Sometimes a minor surgical procedure, often performed in the office, will ease the pain and remove the offending nail. After applying a local anesthetic, the doctor removes part of the nail s side border. Some nails may become ingrown again, requiring removal of the nail root.  Following the nail procedure, a light bandage will be applied. Most people experience very little pain after surgery and may resume normal activity the next day. If your surgeon has prescribed an oral antibiotic, be sure to take all the medication, even if your symptoms have improved.  PREVENTION:  Proper Trimming: Cut toenails in a fairly straight line, and don t cut them too short. You should be able to get your fingernail under the sides and end of the nail.   Well-fitting Footwear: Don t wear shoes that are short or tight in the toe area. Avoid shoes that are loose, because they too cause pressure on the toes, especially when running or walking briskly.     INGROWN TOENAIL REMOVAL AFTERCARE   Go directly home and elevate the affected foot on one or two pillows for the remainder of the day/evening if possible. Your toe may stay numb anywhere from 2-8 hours.    Take Tylenol, ibuprofen or another anti-inflammatory as needed for pain.   Take antibiotic if that has been prescribed. Finish the entire prescribed antibiotic even if your symptoms have improved.   The evening of the procedure, soak/wash the affected area in warm water (you may add Epsom salt) for 5 to 10 minutes. Do this twice a day for 2-4 weeks (6-8 weeks if you had phenol) (you may count showering/bathing as one soak).  After soaks, pat the area dry and then allow to airdry for a few minutes. Apply antibiotic ointment to the area and cover with 2 X 2 gauze and paper tape or band-aid.  You may pursue everyday activities as tolerated with either an open toe shoe or cut-out shoe as needed or you may wear regular shoes if no pain is noted.  Watch for any signs and symptoms of infection such as: redness, red streaks going up the foot/leg, swelling, pus or foul odor. Those that have had the phenol procedure, the toe will drain longer and will look like it is infected because it is a chemical burn.   Please call with questions.

## 2025-07-08 NOTE — LETTER
2025      Marlin Munoz  [address] [place] [address]      Dear Colleague,    Thank you for referring your patient, Marlin Munoz, to the [facility]. Please see a copy of my visit note below.    PATIENT HISTORY:  Marlin Munoz is a 69 year old female who presents to clinic for painful ingrown nail to the right great toe.  Notes has been going on for a few months.  Notes it is hard for her to clip her nails.  Pain can be 4 out of 10 at its worst.  Worse with pressure.  Denies specific injury.  Wondering what can be done for it.    Review of Systems:  Patient denies fever, chills, rash, wound, stiffness, limping, numbness, weakness, heart burn, blood in stool, chest pain with activity, calf pain when walking, shortness of breath with activity, chronic cough, easy bleeding/bruising, swelling of ankles, excessive thirst, fatigue, depression, anxiety.       PAST MEDICAL HISTORY:   Past Medical History:   Diagnosis Date     Arthritis     osteoarthritis     Fatigue      Fibromyalgia      Hemangioma     capillary     Lipoma      Seborrheic keratoses      Sinusitis      Uncomplicated asthma     this was mentioned by a nurse once, but I haven't had a formal diagnosis        PAST SURGICAL HISTORY:   Past Surgical History:   Procedure Laterality Date     ABDOMEN SURGERY  85 and 2001    C section and hysterectomy     BIOPSY BREAST Left       SECTION       COLONOSCOPY  est. ?     GYN SURGERY      see abdominal note above     HYSTERECTOMY      Cervix sparing     OOPHORECTOMY       OTHER SURGICAL HISTORY      lipoma removed     SOFT TISSUE SURGERY      lipoma removal        MEDICATIONS:   Current Outpatient Medications:      fluticasone (FLONASE) 50 MCG/ACT nasal spray, Spray 2 sprays into both nostrils daily, Disp: 18.2 mL, Rfl: 1     levocetirizine (XYZAL) 5 MG tablet, TAKE 1 TABLET BY MOUTH EVERY EVENING, Disp: 30 tablet, Rfl: 1     MAGNESIUM PO, Take 200  mg by mouth., Disp: , Rfl:      Multiple Vitamin (MULTI-VITAMIN DAILY PO), , Disp: , Rfl:      vitamin B complex with vitamin C (VITAMIN  B COMPLEX) tablet, Take 1 tablet by mouth daily, Disp: , Rfl:      VITAMIN D PO, Take 1,000 Units by mouth., Disp: , Rfl:      ALLERGIES:    Allergies   Allergen Reactions     Asa [Aspirin]      Keflex [Cephalexin]      Sulfa Antibiotics         SOCIAL HISTORY:   Social History     Socioeconomic History     Marital status:      Spouse name: Not on file     Number of children: Not on file     Years of education: Not on file     Highest education level: Not on file   Occupational History     Not on file   Tobacco Use     Smoking status: Never     Passive exposure: Past (dad smoked until 1963)     Smokeless tobacco: Never   Vaping Use     Vaping status: Never Used   Substance and Sexual Activity     Alcohol use: Not Currently     Comment: 0-1 drinks/week     Drug use: No     Sexual activity: Yes     Partners: Male     Birth control/protection: None     Comment: menopausal through hysterectomy   Other Topics Concern     Parent/sibling w/ CABG, MI or angioplasty before 65F 55M? No   Social History Narrative    Presented with  10/17/17       Social Drivers of Health     Financial Resource Strain: Low Risk  (8/28/2024)    Financial Resource Strain      Within the past 12 months, have you or your family members you live with been unable to get utilities (heat, electricity) when it was really needed?: No   Food Insecurity: Low Risk  (8/28/2024)    Food Insecurity      Within the past 12 months, did you worry that your food would run out before you got money to buy more?: No      Within the past 12 months, did the food you bought just not last and you didn t have money to get more?: No   Transportation Needs: Low Risk  (8/28/2024)    Transportation Needs      Within the past 12 months, has lack of transportation kept you from medical appointments, getting your medicines,  non-medical meetings or appointments, work, or from getting things that you need?: No   Physical Activity: Sufficiently Active (8/28/2024)    Exercise Vital Sign      Days of Exercise per Week: 4 days      Minutes of Exercise per Session: 50 min   Stress: No Stress Concern Present (8/28/2024)    [employer] [place] [employer] - Occupational Stress Questionnaire      Feeling of Stress : Only a little   Social Connections: Unknown (8/28/2024)    Social Connection and Isolation Panel [NHANES]      Frequency of Communication with Friends and Family: Not on file      Frequency of Social Gatherings with Friends and Family: Three times a week      Attends Islam Services: Not on file      Active Member of Clubs or Organizations: Not on file      Attends Club or Organization Meetings: Not on file      Marital Status: Not on file   Interpersonal Safety: Low Risk  (8/28/2024)    Interpersonal Safety      Do you feel physically and emotionally safe where you currently live?: Yes      Within the past 12 months, have you been hit, slapped, kicked or otherwise physically hurt by someone?: No      Within the past 12 months, have you been humiliated or emotionally abused in other ways by your partner or ex-partner?: No   Housing Stability: Low Risk  (8/28/2024)    Housing Stability      Do you have housing? : Yes      Are you worried about losing your housing?: No        FAMILY HISTORY:   Family History   Problem Relation Age of Onset     Breast Cancer Mother 80     Osteoporosis Mother      Cancer Father 46        Lung     Other Cancer Father         lung     Breast Cancer Sister 74     Colon Cancer Paternal Aunt 55     Cancer Paternal Uncle 65        Lung     Asthma Niece         EXAM:Vitals: There were no vitals taken for this visit.  BMI= There is no height or weight on file to calculate BMI.      General appearance: Patient is alert and fully cooperative with history & exam.  No sign of distress is noted during the  visit.     Psychiatric: Affect is pleasant & appropriate.  Patient appears motivated to improve health.     Respiratory: Breathing is regular & unlabored while sitting.     HEENT: Hearing is intact to spoken word.  Speech is clear.  No gross evidence of visual impairment that would impact ambulation.     Dermatologic: Lateral border of the right great toenail is incurvated.  No redness but pain on palpation.     Vascular: DP & PT pulses are intact & regular bilaterally.  No significant edema or varicosities noted.  CFT and skin temperature is normal to both lower extremities.     Neurologic: Lower extremity sensation is intact to light touch.  No evidence of weakness or contracture in the lower extremities.  No evidence of neuropathy.     Musculoskeletal: Patient is ambulatory without assistive device or brace.  No gross ankle deformity noted.  No foot or ankle joint effusion is noted.     ASSESSMENT:    Right foot pain  Ingrown nail of great toe of right foot     Medical Decision Making/Plan:  Review[facility] in [facility].  The potential causes and nature of an ingrown toenail were discussed with the patient.  We reviewed the natural history/prognosis of the condition and potential risks if no treatment is provided.      Treatment options discussed included conservative management (oral antibiotics, soaking of foot, adequate width shoes)  as well as surgical management (partial or total nail removal).  The pros and cons of both forms of treatment were reviewed.      After thorough discussion and answering all questions, the patient elected to have border removed.  She will soak the foot twice a day for 2 weeks and apply antibiotic ointment and a bandage.    Procedure:After verbal consent, the right great toe was anesthetized with 5cc's of 1% lidocaine plain. A tourniquet was applied to the toe. The lateral border was then raised from the nail bed and then cut the length of the nail.  The offending nail border  was then removed.  .  Bacitracin was applied to the nail bed.  The tourniquet was removed.  Bandage was applied to the toe.  The patient tolerated the procedure and anesthesia well.    Patient risk factor: Patient is at low risk for infection.     All questions were answered to patients satisfaction and they will call with further questions or concerns.       Zabrina Sevilla DPM, Podiatry/Foot and Ankle Surgery      Again, thank you for allowing me to participate in the care of your patient.        Sincerely,        Zabrina Sevilla DPM, Podiatry/Foot and Ankle Surgery    Electronically signed

## 2025-07-08 NOTE — PROGRESS NOTES
PATIENT HISTORY:  Marlin Munoz is a 69 year old female who presents to clinic for painful ingrown nail to the right great toe.  Notes has been going on for a few months.  Notes it is hard for her to clip her nails.  Pain can be 4 out of 10 at its worst.  Worse with pressure.  Denies specific injury.  Wondering what can be done for it.    Review of Systems:  Patient denies fever, chills, rash, wound, stiffness, limping, numbness, weakness, heart burn, blood in stool, chest pain with activity, calf pain when walking, shortness of breath with activity, chronic cough, easy bleeding/bruising, swelling of ankles, excessive thirst, fatigue, depression, anxiety.       PAST MEDICAL HISTORY:   Past Medical History:   Diagnosis Date    Arthritis     osteoarthritis    Fatigue     Fibromyalgia     Hemangioma     capillary    Lipoma     Seborrheic keratoses     Sinusitis     Uncomplicated asthma     this was mentioned by a nurse once, but I haven't had a formal diagnosis        PAST SURGICAL HISTORY:   Past Surgical History:   Procedure Laterality Date    ABDOMEN SURGERY  85 and 2001    C section and hysterectomy    BIOPSY BREAST Left      SECTION      COLONOSCOPY  est. ?    GYN SURGERY      see abdominal note above    HYSTERECTOMY      Cervix sparing    OOPHORECTOMY      OTHER SURGICAL HISTORY      lipoma removed    SOFT TISSUE SURGERY      lipoma removal        MEDICATIONS:   Current Outpatient Medications:     fluticasone (FLONASE) 50 MCG/ACT nasal spray, Spray 2 sprays into both nostrils daily, Disp: 18.2 mL, Rfl: 1    levocetirizine (XYZAL) 5 MG tablet, TAKE 1 TABLET BY MOUTH EVERY EVENING, Disp: 30 tablet, Rfl: 1    MAGNESIUM PO, Take 200 mg by mouth., Disp: , Rfl:     Multiple Vitamin (MULTI-VITAMIN DAILY PO), , Disp: , Rfl:     vitamin B complex with vitamin C (VITAMIN  B COMPLEX) tablet, Take 1 tablet by mouth daily, Disp: , Rfl:     VITAMIN D PO, Take 1,000 Units by mouth., Disp: , Rfl:       ALLERGIES:    Allergies   Allergen Reactions    Asa [Aspirin]     Keflex [Cephalexin]     Sulfa Antibiotics         SOCIAL HISTORY:   Social History     Socioeconomic History    Marital status:      Spouse name: Not on file    Number of children: Not on file    Years of education: Not on file    Highest education level: Not on file   Occupational History    Not on file   Tobacco Use    Smoking status: Never     Passive exposure: Past (dad smoked until 1963)    Smokeless tobacco: Never   Vaping Use    Vaping status: Never Used   Substance and Sexual Activity    Alcohol use: Not Currently     Comment: 0-1 drinks/week    Drug use: No    Sexual activity: Yes     Partners: Male     Birth control/protection: None     Comment: menopausal through hysterectomy   Other Topics Concern    Parent/sibling w/ CABG, MI or angioplasty before 65F 55M? No   Social History Narrative    Presented with  10/17/17       Social Drivers of Health     Financial Resource Strain: Low Risk  (8/28/2024)    Financial Resource Strain     Within the past 12 months, have you or your family members you live with been unable to get utilities (heat, electricity) when it was really needed?: No   Food Insecurity: Low Risk  (8/28/2024)    Food Insecurity     Within the past 12 months, did you worry that your food would run out before you got money to buy more?: No     Within the past 12 months, did the food you bought just not last and you didn t have money to get more?: No   Transportation Needs: Low Risk  (8/28/2024)    Transportation Needs     Within the past 12 months, has lack of transportation kept you from medical appointments, getting your medicines, non-medical meetings or appointments, work, or from getting things that you need?: No   Physical Activity: Sufficiently Active (8/28/2024)    Exercise Vital Sign     Days of Exercise per Week: 4 days     Minutes of Exercise per Session: 50 min   Stress: No Stress Concern Present  (8/28/2024)    [employer] [place] [employer] - Occupational Stress Questionnaire     Feeling of Stress : Only a little   Social Connections: Unknown (8/28/2024)    Social Connection and Isolation Panel [NHANES]     Frequency of Communication with Friends and Family: Not on file     Frequency of Social Gatherings with Friends and Family: Three times a week     Attends Latter day Services: Not on file     Active Member of Clubs or Organizations: Not on file     Attends Club or Organization Meetings: Not on file     Marital Status: Not on file   Interpersonal Safety: Low Risk  (8/28/2024)    Interpersonal Safety     Do you feel physically and emotionally safe where you currently live?: Yes     Within the past 12 months, have you been hit, slapped, kicked or otherwise physically hurt by someone?: No     Within the past 12 months, have you been humiliated or emotionally abused in other ways by your partner or ex-partner?: No   Housing Stability: Low Risk  (8/28/2024)    Housing Stability     Do you have housing? : Yes     Are you worried about losing your housing?: No        FAMILY HISTORY:   Family History   Problem Relation Age of Onset    Breast Cancer Mother 80    Osteoporosis Mother     Cancer Father 46        Lung    Other Cancer Father         lung    Breast Cancer Sister 74    Colon Cancer Paternal Aunt 55    Cancer Paternal Uncle 65        Lung    Asthma Niece         EXAM:Vitals: There were no vitals taken for this visit.  BMI= There is no height or weight on file to calculate BMI.      General appearance: Patient is alert and fully cooperative with history & exam.  No sign of distress is noted during the visit.     Psychiatric: Affect is pleasant & appropriate.  Patient appears motivated to improve health.     Respiratory: Breathing is regular & unlabored while sitting.     HEENT: Hearing is intact to spoken word.  Speech is clear.  No gross evidence of visual impairment that would impact  ambulation.     Dermatologic: Lateral border of the right great toenail is incurvated.  No redness but pain on palpation.     Vascular: DP & PT pulses are intact & regular bilaterally.  No significant edema or varicosities noted.  CFT and skin temperature is normal to both lower extremities.     Neurologic: Lower extremity sensation is intact to light touch.  No evidence of weakness or contracture in the lower extremities.  No evidence of neuropathy.     Musculoskeletal: Patient is ambulatory without assistive device or brace.  No gross ankle deformity noted.  No foot or ankle joint effusion is noted.     ASSESSMENT:    Right foot pain  Ingrown nail of great toe of right foot     Medical Decision Making/Plan:  Reviewed patient's chart in [facility].  The potential causes and nature of an ingrown toenail were discussed with the patient.  We reviewed the natural history/prognosis of the condition and potential risks if no treatment is provided.      Treatment options discussed included conservative management (oral antibiotics, soaking of foot, adequate width shoes)  as well as surgical management (partial or total nail removal).  The pros and cons of both forms of treatment were reviewed.      After thorough discussion and answering all questions, the patient elected to have border removed.  She will soak the foot twice a day for 2 weeks and apply antibiotic ointment and a bandage.    Procedure:After verbal consent, the right great toe was anesthetized with 5cc's of 1% lidocaine plain. A tourniquet was applied to the toe. The lateral border was then raised from the nail bed and then cut the length of the nail.  The offending nail border was then removed.  .  Bacitracin was applied to the nail bed.  The tourniquet was removed.  Bandage was applied to the toe.  The patient tolerated the procedure and anesthesia well.    Patient risk factor: Patient is at low risk for infection.     All questions were answered to patients  satisfaction and they will call with further questions or concerns.       Zabrina Sevilla DPM, Podiatry/Foot and Ankle Surgery

## 2025-07-09 ENCOUNTER — ANCILLARY PROCEDURE (OUTPATIENT)
Dept: MAMMOGRAPHY | Facility: HOSPITAL | Age: 70
End: 2025-07-09
Attending: INTERNAL MEDICINE
Payer: MEDICARE

## 2025-07-09 DIAGNOSIS — Z12.31 VISIT FOR SCREENING MAMMOGRAM: ICD-10-CM

## 2025-07-09 PROCEDURE — 77063 BREAST TOMOSYNTHESIS BI: CPT

## 2025-07-22 ENCOUNTER — TRANSFERRED RECORDS (OUTPATIENT)
Dept: HEALTH INFORMATION MANAGEMENT | Facility: CLINIC | Age: 70
End: 2025-07-22
Payer: MEDICARE